# Patient Record
Sex: MALE | Race: BLACK OR AFRICAN AMERICAN | NOT HISPANIC OR LATINO | Employment: OTHER | URBAN - METROPOLITAN AREA
[De-identification: names, ages, dates, MRNs, and addresses within clinical notes are randomized per-mention and may not be internally consistent; named-entity substitution may affect disease eponyms.]

---

## 2017-10-06 DIAGNOSIS — S56.811D: Primary | ICD-10-CM

## 2017-10-12 ENCOUNTER — HOSPITAL ENCOUNTER (OUTPATIENT)
Dept: RADIOLOGY | Facility: HOSPITAL | Age: 67
Discharge: HOME | End: 2017-10-12
Attending: FAMILY MEDICINE
Payer: MEDICARE

## 2017-10-12 DIAGNOSIS — S56.811D: ICD-10-CM

## 2017-10-12 PROCEDURE — 73221 MRI JOINT UPR EXTREM W/O DYE: CPT | Mod: TC,PO,RT

## 2021-03-10 DIAGNOSIS — M51.16 NEURITIS OR RADICULITIS DUE TO RUPTURE OF LUMBAR INTERVERTEBRAL DISC: Primary | ICD-10-CM

## 2021-03-18 ENCOUNTER — HOSPITAL ENCOUNTER (OUTPATIENT)
Dept: RADIOLOGY | Facility: HOSPITAL | Age: 71
Discharge: HOME | End: 2021-03-18
Attending: PHYSICAL MEDICINE & REHABILITATION
Payer: MEDICARE

## 2021-03-18 DIAGNOSIS — M51.16 NEURITIS OR RADICULITIS DUE TO RUPTURE OF LUMBAR INTERVERTEBRAL DISC: ICD-10-CM

## 2021-03-18 PROCEDURE — 72148 MRI LUMBAR SPINE W/O DYE: CPT | Mod: TC,PO

## 2021-03-31 DIAGNOSIS — M54.12 RADICULOPATHY, CERVICAL REGION: Primary | ICD-10-CM

## 2021-03-31 DIAGNOSIS — M51.16 INTERVERTEBRAL DISC DISORDER WITH RADICULOPATHY OF LUMBAR REGION: Primary | ICD-10-CM

## 2021-04-14 ENCOUNTER — HOSPITAL ENCOUNTER (OUTPATIENT)
Dept: RADIOLOGY | Facility: HOSPITAL | Age: 71
Discharge: HOME | End: 2021-04-14
Attending: PHYSICAL MEDICINE & REHABILITATION
Payer: MEDICARE

## 2021-04-14 DIAGNOSIS — M51.16 INTERVERTEBRAL DISC DISORDER WITH RADICULOPATHY OF LUMBAR REGION: ICD-10-CM

## 2021-04-14 DIAGNOSIS — M54.12 RADICULOPATHY, CERVICAL REGION: ICD-10-CM

## 2021-04-14 PROCEDURE — 72141 MRI NECK SPINE W/O DYE: CPT | Mod: TC,PO

## 2021-04-14 PROCEDURE — 72110 X-RAY EXAM L-2 SPINE 4/>VWS: CPT | Mod: TC,FY,PO

## 2021-04-26 ENCOUNTER — HOSPITAL ENCOUNTER (OUTPATIENT)
Dept: RADIOLOGY | Facility: HOSPITAL | Age: 71
Discharge: HOME | End: 2021-04-26
Attending: FAMILY MEDICINE
Payer: MEDICARE

## 2021-04-26 DIAGNOSIS — Z01.818 OTHER SPECIFIED PRE-OPERATIVE EXAMINATION: ICD-10-CM

## 2021-04-26 PROCEDURE — 71046 X-RAY EXAM CHEST 2 VIEWS: CPT | Mod: TC,FY,PO

## 2022-02-09 ENCOUNTER — HOSPITAL ENCOUNTER (OUTPATIENT)
Dept: RADIOLOGY | Facility: HOSPITAL | Age: 72
Discharge: HOME | End: 2022-02-09
Attending: FAMILY MEDICINE
Payer: MEDICARE

## 2022-02-09 DIAGNOSIS — Z01.818 PREOP EXAMINATION: ICD-10-CM

## 2022-02-09 PROCEDURE — 71046 X-RAY EXAM CHEST 2 VIEWS: CPT | Mod: TC,FY,PO

## 2022-11-22 ENCOUNTER — HOSPITAL ENCOUNTER (OUTPATIENT)
Dept: RADIOLOGY | Facility: HOSPITAL | Age: 72
Discharge: HOME | End: 2022-11-22
Attending: FAMILY MEDICINE
Payer: MEDICARE

## 2022-11-22 DIAGNOSIS — K57.92 DIVERTICULITIS: ICD-10-CM

## 2022-11-22 DIAGNOSIS — K57.92 DIVERTICULITIS: Primary | ICD-10-CM

## 2022-11-22 PROCEDURE — 25500020 PHARM REV CODE 255: Mod: PO | Performed by: FAMILY MEDICINE

## 2022-11-22 PROCEDURE — 74178 CT ABD&PLV WO CNTR FLWD CNTR: CPT | Mod: TC,PO

## 2022-11-22 RX ADMIN — IOHEXOL 30 ML: 300 INJECTION, SOLUTION INTRAVENOUS at 01:11

## 2022-11-22 RX ADMIN — IOHEXOL 100 ML: 350 INJECTION, SOLUTION INTRAVENOUS at 01:11

## 2023-04-05 ENCOUNTER — TELEPHONE (OUTPATIENT)
Dept: OPHTHALMOLOGY | Facility: CLINIC | Age: 73
End: 2023-04-05
Payer: MEDICARE

## 2023-04-05 NOTE — TELEPHONE ENCOUNTER
----- Message from Delisa Rhodes sent at 4/5/2023  2:21 PM CDT -----  Regarding: Appt Inquiry   Dr. Daniels office called about pts appt and if pt can be seen with Dr. Beck sooner?    Call back- [phone] Ramya

## 2023-05-16 ENCOUNTER — OFFICE VISIT (OUTPATIENT)
Dept: OPHTHALMOLOGY | Facility: CLINIC | Age: 73
End: 2023-05-16
Payer: MEDICARE

## 2023-05-16 DIAGNOSIS — H40.50X0 SECONDARY OPEN-ANGLE GLAUCOMA: ICD-10-CM

## 2023-05-16 DIAGNOSIS — H43.813 POSTERIOR VITREOUS DETACHMENT, BOTH EYES: ICD-10-CM

## 2023-05-16 DIAGNOSIS — Z96.1 PSEUDOPHAKIA OF BOTH EYES: ICD-10-CM

## 2023-05-16 DIAGNOSIS — H40.52X3 SECONDARY GLAUCOMA, LEFT, SEVERE STAGE: Primary | ICD-10-CM

## 2023-05-16 DIAGNOSIS — H35.3131 EARLY DRY STAGE NONEXUDATIVE AGE-RELATED MACULAR DEGENERATION OF BOTH EYES: ICD-10-CM

## 2023-05-16 DIAGNOSIS — H27.112 LENS SUBLUXATION, LEFT: ICD-10-CM

## 2023-05-16 PROCEDURE — 76514 ECHO EXAM OF EYE THICKNESS: CPT | Mod: PBBFAC,PN | Performed by: STUDENT IN AN ORGANIZED HEALTH CARE EDUCATION/TRAINING PROGRAM

## 2023-05-16 PROCEDURE — 99999 PR PBB SHADOW E&M-EST. PATIENT-LVL II: CPT | Mod: PBBFAC,,, | Performed by: STUDENT IN AN ORGANIZED HEALTH CARE EDUCATION/TRAINING PROGRAM

## 2023-05-16 PROCEDURE — 99204 PR OFFICE/OUTPT VISIT, NEW, LEVL IV, 45-59 MIN: ICD-10-PCS | Mod: S$PBB,,, | Performed by: STUDENT IN AN ORGANIZED HEALTH CARE EDUCATION/TRAINING PROGRAM

## 2023-05-16 PROCEDURE — 92020 GONIOSCOPY: CPT | Mod: PBBFAC,PN | Performed by: STUDENT IN AN ORGANIZED HEALTH CARE EDUCATION/TRAINING PROGRAM

## 2023-05-16 PROCEDURE — 92133 CPTRZD OPH DX IMG PST SGM ON: CPT | Mod: PBBFAC,PN | Performed by: STUDENT IN AN ORGANIZED HEALTH CARE EDUCATION/TRAINING PROGRAM

## 2023-05-16 PROCEDURE — 99999 PR PBB SHADOW E&M-EST. PATIENT-LVL II: ICD-10-PCS | Mod: PBBFAC,,, | Performed by: STUDENT IN AN ORGANIZED HEALTH CARE EDUCATION/TRAINING PROGRAM

## 2023-05-16 PROCEDURE — 99204 OFFICE O/P NEW MOD 45 MIN: CPT | Mod: S$PBB,,, | Performed by: STUDENT IN AN ORGANIZED HEALTH CARE EDUCATION/TRAINING PROGRAM

## 2023-05-16 PROCEDURE — 92083 EXTENDED VISUAL FIELD XM: CPT | Mod: PBBFAC,PN | Performed by: STUDENT IN AN ORGANIZED HEALTH CARE EDUCATION/TRAINING PROGRAM

## 2023-05-16 PROCEDURE — 92020 PR SPECIAL EYE EVAL,GONIOSCOPY: ICD-10-PCS | Mod: S$PBB,,, | Performed by: STUDENT IN AN ORGANIZED HEALTH CARE EDUCATION/TRAINING PROGRAM

## 2023-05-16 PROCEDURE — 92020 GONIOSCOPY: CPT | Mod: S$PBB,,, | Performed by: STUDENT IN AN ORGANIZED HEALTH CARE EDUCATION/TRAINING PROGRAM

## 2023-05-16 PROCEDURE — 76514 PR  US, EYE, FOR CORNEAL THICKNESS: ICD-10-PCS | Mod: 26,S$PBB,, | Performed by: STUDENT IN AN ORGANIZED HEALTH CARE EDUCATION/TRAINING PROGRAM

## 2023-05-16 PROCEDURE — 92133 POSTERIOR SEGMENT OCT OPTIC NERVE(OCULAR COHERENCE TOMOGRAPHY) - OU - BOTH EYES: ICD-10-PCS | Mod: 26,S$PBB,, | Performed by: STUDENT IN AN ORGANIZED HEALTH CARE EDUCATION/TRAINING PROGRAM

## 2023-05-16 PROCEDURE — 92083 HUMPHREY VISUAL FIELD - OU - BOTH EYES: ICD-10-PCS | Mod: 26,S$PBB,, | Performed by: STUDENT IN AN ORGANIZED HEALTH CARE EDUCATION/TRAINING PROGRAM

## 2023-05-16 PROCEDURE — 76514 ECHO EXAM OF EYE THICKNESS: CPT | Mod: 26,S$PBB,, | Performed by: STUDENT IN AN ORGANIZED HEALTH CARE EDUCATION/TRAINING PROGRAM

## 2023-05-16 PROCEDURE — 99212 OFFICE O/P EST SF 10 MIN: CPT | Mod: PBBFAC,PN | Performed by: STUDENT IN AN ORGANIZED HEALTH CARE EDUCATION/TRAINING PROGRAM

## 2023-05-16 RX ORDER — BRIMONIDINE TARTRATE 2 MG/ML
SOLUTION/ DROPS OPHTHALMIC
COMMUNITY
Start: 2023-04-10 | End: 2023-09-05 | Stop reason: SDUPTHER

## 2023-05-16 RX ORDER — DORZOLAMIDE HYDROCHLORIDE AND TIMOLOL MALEATE 20; 5 MG/ML; MG/ML
1 SOLUTION/ DROPS OPHTHALMIC 2 TIMES DAILY
COMMUNITY
Start: 2023-03-08

## 2023-05-16 RX ORDER — ACETAZOLAMIDE 500 MG/1
500 CAPSULE, EXTENDED RELEASE ORAL 2 TIMES DAILY
Qty: 60 CAPSULE | Refills: 2 | Status: SHIPPED | OUTPATIENT
Start: 2023-05-16 | End: 2023-07-07

## 2023-05-16 RX ORDER — NETARSUDIL AND LATANOPROST OPHTHALMIC SOLUTION, 0.02%/0.005% .2; .05 MG/ML; MG/ML
SOLUTION/ DROPS OPHTHALMIC; TOPICAL
COMMUNITY
Start: 2023-03-08 | End: 2023-12-13

## 2023-05-16 NOTE — PROGRESS NOTES
Subjective:       Patient ID: Donald Enamorado is a 72 y.o. male.    Chief Complaint: Glaucoma    HPI    Pt was referred here today by Dr. Plaza in [place] for a Glaucoma   Evaluation OS.  He states he was diagnosed with Glaucoma OS about 10 years ago. Dr. Plaza   also performed his Cataract surgery OU. The vision in the left eye is very   blurry, compared to the right. He did have an SLT OS x 1 and did not have   to use drops for maybe a year or so. He has lots floaters OS. Dr. Plaza   told him the gel in the back of the left eye shifted the lens from his   Cataract surgery. He pressure slowly began to get higher. The pressure OS   was even 50 at one point.    Eye Meds:  Latanoprost QHS OS  Brimonidine BID OS  Cosopt BID OS    Last edited by Mary Beck MD on 5/16/2023 10:52 AM.               Assessment & Plan   Secondary glaucoma, left, severe stage  -     Posterior Segment OCT Optic Nerve- Both eyes  -     Mendoza Visual Field - OU - Extended - Both Eyes  -     Color Fundus Photography - OU - Both Eyes; Future    Secondary open-angle glaucoma  -     acetaZOLAMIDE (DIAMOX) 500 mg CpSR; Take 1 capsule (500 mg total) by mouth 2 (two) times daily.  Dispense: 60 capsule; Refill: 2    Pseudophakia of both eyes    Early dry stage nonexudative age-related macular degeneration of both eyes    Lens subluxation, left    Posterior vitreous detachment, both eyes      // secondary open angle glaucoma severe with tr APD OS   -Fhx (-), Steroids (+),Trauma(-)  -Drops:  Latanoprost QHS OS // Brimonidine BID OS // Cosopt BID OS  -Drop intolerance/contraindication: Rocklatan not effective per prior notes   -Laser: SLT OS x 1 (2-3 years ago)  -Surgeries: CE IOL OU  -CCT: 613 // 619  -Gonio: SS OU  Tm 50s OS per pt    Performed independent review of outside records including notes and tests  Discussed imaging and interpretation with patient.   Discussed eyedrops and if more than one, recommend 5 minutes between all drops.      5/23 RNFL Full // SNS/INS hint SAD/IAD VFI 85% OS  5/23 HVF 24-2 Full // dec G B TS/NS/TI/NI OS    Continue gtts  Start diamox 500 ER BID - discussed side effects    Vitreous in AC may be complicating IOP -- Retina referral for consideration of PPV  Discussed potential for surgical glaucoma intervention if IOP remains high  Would need vitreous in AC removed before glaucoma surgery   Could consider PPV then tube same day     IOL subluxation OS  Retina eval    Pseudophakia OD  Lenses WC OD, monitor    ARMD  Appears mild dry OU  Need OCT-retina  Retina eval     Posterior Vitreous Detachment  Retinal detachment precautions were reviewed the patient.    If any increase in flashing lights or floaters, the patient should return immediately to rule out retinal detachment.        PLAN  Continue all other drops  Start diamox 500 mg PO BID  Retina referral     RTC 6 weeks IOP check or sooner PRN    Mary Beck M.D., M.S.  Department of Ophthalmology   Division of Glaucoma Surgery  [facility]

## 2023-06-29 ENCOUNTER — OFFICE VISIT (OUTPATIENT)
Dept: OPHTHALMOLOGY | Facility: CLINIC | Age: 73
End: 2023-06-29
Payer: MEDICARE

## 2023-06-29 ENCOUNTER — TELEPHONE (OUTPATIENT)
Dept: OPHTHALMOLOGY | Facility: CLINIC | Age: 73
End: 2023-06-29

## 2023-06-29 DIAGNOSIS — H35.3132 INTERMEDIATE STAGE DRY AGE-RELATED MACULAR DEGENERATION OF BOTH EYES: ICD-10-CM

## 2023-06-29 DIAGNOSIS — H40.52X3 SECONDARY GLAUCOMA, LEFT, SEVERE STAGE: ICD-10-CM

## 2023-06-29 DIAGNOSIS — H43.02 VITREOUS IN ANTERIOR CHAMBER OF LEFT EYE: ICD-10-CM

## 2023-06-29 DIAGNOSIS — H43.813 POSTERIOR VITREOUS DETACHMENT, BILATERAL: Primary | ICD-10-CM

## 2023-06-29 DIAGNOSIS — H43.813 POSTERIOR VITREOUS DETACHMENT OF BOTH EYES: Primary | ICD-10-CM

## 2023-06-29 PROCEDURE — 99999 PR PBB SHADOW E&M-EST. PATIENT-LVL II: ICD-10-PCS | Mod: PBBFAC,,, | Performed by: OPHTHALMOLOGY

## 2023-06-29 PROCEDURE — 92134 CPTRZ OPH DX IMG PST SGM RTA: CPT | Mod: PBBFAC | Performed by: OPHTHALMOLOGY

## 2023-06-29 PROCEDURE — 92014 COMPRE OPH EXAM EST PT 1/>: CPT | Mod: S$PBB,,, | Performed by: OPHTHALMOLOGY

## 2023-06-29 PROCEDURE — 99999 PR PBB SHADOW E&M-EST. PATIENT-LVL II: CPT | Mod: PBBFAC,,, | Performed by: OPHTHALMOLOGY

## 2023-06-29 PROCEDURE — 92201 PR OPHTHALMOSCOPY, EXT, W/RET DRAW/SCLERAL DEPR, I&R, UNI/BI: ICD-10-PCS | Mod: S$PBB,,, | Performed by: OPHTHALMOLOGY

## 2023-06-29 PROCEDURE — 92134 POSTERIOR SEGMENT OCT RETINA (OCULAR COHERENCE TOMOGRAPHY)-BOTH EYES: ICD-10-PCS | Mod: 26,S$PBB,, | Performed by: OPHTHALMOLOGY

## 2023-06-29 PROCEDURE — 92201 OPSCPY EXTND RTA DRAW UNI/BI: CPT | Mod: PBBFAC | Performed by: OPHTHALMOLOGY

## 2023-06-29 PROCEDURE — 99212 OFFICE O/P EST SF 10 MIN: CPT | Mod: PBBFAC | Performed by: OPHTHALMOLOGY

## 2023-06-29 PROCEDURE — 92201 OPSCPY EXTND RTA DRAW UNI/BI: CPT | Mod: S$PBB,,, | Performed by: OPHTHALMOLOGY

## 2023-06-29 PROCEDURE — 92014 PR EYE EXAM, EST PATIENT,COMPREHESV: ICD-10-PCS | Mod: S$PBB,,, | Performed by: OPHTHALMOLOGY

## 2023-06-29 NOTE — Clinical Note
May be easier to have me do quick and easy PPV and then send to you shortly thereafter for GDD, as trying to find schedule time together may be a challenge.

## 2023-06-29 NOTE — PROGRESS NOTES
HPI    New Patient- ref by Dr. Beck    Patient here today with c/o blurry VA ou, no pain floaters without flashes   ou. Patient report intolerance to Diamox given by Dr. Beck for IOP OS,   patient discontinued.    Latanoprost QHS OS   Brimonidine BID OS   Cosopt BID OS     Last edited by Brittnee Salgado on 6/29/2023  1:38 PM.        OCT - drusen OU      A/P    Intermediate AMD OU  Discussed AREDS    2. POAG OU  Secondary severe Glc OS  Referred to Dr. Beck from Dr. Plaza  Considering GDD - but would like AC clear prior.    Plan 25g PPV OS for vit opacities and anterior migration of vitreous to anterior chamber  PC Tear - try without AFx to minimize chance of IOL dislocation\  Will discuss timing with Dr. Beck, but may be easiest to have be clean out vit first and then proceed with GDD shortly after    Local MAC  LOC 20 min    Risks, benefits, and alternatives to treatment discussed in detail with the patient.  The patient voiced understanding and wished to proceed with the procedure      3. PCIOL  Open PC OS with vit AC    4. PVD OU       To OR

## 2023-07-07 ENCOUNTER — OFFICE VISIT (OUTPATIENT)
Dept: OPHTHALMOLOGY | Facility: CLINIC | Age: 73
End: 2023-07-07
Payer: MEDICARE

## 2023-07-07 DIAGNOSIS — H43.02 VITREOUS IN ANTERIOR CHAMBER OF LEFT EYE: ICD-10-CM

## 2023-07-07 DIAGNOSIS — H43.813 POSTERIOR VITREOUS DETACHMENT OF BOTH EYES: ICD-10-CM

## 2023-07-07 DIAGNOSIS — H35.3132 INTERMEDIATE STAGE DRY AGE-RELATED MACULAR DEGENERATION OF BOTH EYES: ICD-10-CM

## 2023-07-07 DIAGNOSIS — H43.813 POSTERIOR VITREOUS DETACHMENT, BILATERAL: ICD-10-CM

## 2023-07-07 DIAGNOSIS — H40.52X3 SECONDARY GLAUCOMA, LEFT, SEVERE STAGE: Primary | ICD-10-CM

## 2023-07-07 PROCEDURE — 92012 PR EYE EXAM, EST PATIENT,INTERMED: ICD-10-PCS | Mod: S$PBB,,, | Performed by: STUDENT IN AN ORGANIZED HEALTH CARE EDUCATION/TRAINING PROGRAM

## 2023-07-07 PROCEDURE — 99999 PR PBB SHADOW E&M-EST. PATIENT-LVL III: ICD-10-PCS | Mod: PBBFAC,,, | Performed by: STUDENT IN AN ORGANIZED HEALTH CARE EDUCATION/TRAINING PROGRAM

## 2023-07-07 PROCEDURE — 99999 PR PBB SHADOW E&M-EST. PATIENT-LVL III: CPT | Mod: PBBFAC,,, | Performed by: STUDENT IN AN ORGANIZED HEALTH CARE EDUCATION/TRAINING PROGRAM

## 2023-07-07 PROCEDURE — 92012 INTRM OPH EXAM EST PATIENT: CPT | Mod: S$PBB,,, | Performed by: STUDENT IN AN ORGANIZED HEALTH CARE EDUCATION/TRAINING PROGRAM

## 2023-07-07 PROCEDURE — 99213 OFFICE O/P EST LOW 20 MIN: CPT | Mod: PBBFAC | Performed by: STUDENT IN AN ORGANIZED HEALTH CARE EDUCATION/TRAINING PROGRAM

## 2023-07-07 RX ORDER — OXYCODONE AND ACETAMINOPHEN 5; 325 MG/1; MG/1
1 TABLET ORAL EVERY 6 HOURS PRN
COMMUNITY
Start: 2023-04-26

## 2023-07-07 RX ORDER — BETAMETHASONE DIPROPIONATE 0.5 MG/G
CREAM TOPICAL
COMMUNITY
Start: 2023-05-18

## 2023-07-07 RX ORDER — RIVAROXABAN 10 MG/1
10 TABLET, FILM COATED ORAL
COMMUNITY
Start: 2023-04-12

## 2023-07-07 RX ORDER — PREGABALIN 75 MG/1
75 CAPSULE ORAL 2 TIMES DAILY
COMMUNITY
Start: 2023-04-12

## 2023-07-07 NOTE — PROGRESS NOTES
Subjective:       Patient ID: Donald Enamorado is a 72 y.o. male.    Chief Complaint: Glaucoma     HPI    Patient is here today for IOP check.  Pt. States OS canthus area is sore   and it gets crusty mostly in the morning. Pt reports diamox gave him   diarrhea   Last edited by Mary Beck MD on 7/7/2023  2:28 PM.              Assessment & Plan   Secondary glaucoma, left, severe stage    Vitreous in anterior chamber of left eye    Posterior vitreous detachment, bilateral    Posterior vitreous detachment of both eyes    Intermediate stage dry age-related macular degeneration of both eyes         // secondary open angle glaucoma severe with tr APD OS   -Fhx (-), Steroids (+),Trauma(-)  -Drops:  Latanoprost QHS OS // Brimonidine BID OS // Cosopt BID OS  -Drop intolerance/contraindication: Rocklatan not effective per prior notes, Diamox made patient stomache   -Laser: SLT OS x 1 (2-3 years ago)  -Surgeries: CE IOL OU  -CCT: 613 // 619  -Gonio: SS OU  Tm 50s OS per pt    5/23 RNFL Full // SNS/INS hint SAD/IAD VFI 85% OS  5/23 HVF 24-2 Full // dec G B TS/NS/TI/NI OS    Pt unable to tolerate Diamox  Out of gtt options at this point but IOP actually somewhat better today   Will consider GDD post vitrectomy if needed   Back to me after vit     Medial canthus red OS  Does not look like brimonidine allergy or infection  Many skin conditions  Try vaseline    Vitreous in AC   Vitrectomy with Dr. Harris scheduled 8/2/23       IOL subluxation OS  Retina eval    Pseudophakia OD  Lenses WC OD, monitor    ARMD  Intermediate. AREDS    Posterior Vitreous Detachment  Retinal detachment precautions were reviewed the patient.    If any increase in flashing lights or floaters, the patient should return immediately to rule out retinal detachment.        PLAN  Continue all other drops      RTC post vitrectomy for glc mgmt         Mary Beck M.D., M.S.  Department of Ophthalmology   Division of Glaucoma Surgery  [facility]

## 2023-08-01 ENCOUNTER — TELEPHONE (OUTPATIENT)
Dept: OPHTHALMOLOGY | Facility: CLINIC | Age: 73
End: 2023-08-01
Payer: MEDICARE

## 2023-08-01 NOTE — H&P
Pre-Operative History & Physical  Ophthalmology      SUBJECTIVE:     History of Present Illness:  Patient is a 72 y.o. male presents with vitreous in anterior chamber of the left eye.    MEDICATIONS:   PTA Medications   Medication Sig    albuterol (PROVENTIL) 2.5 mg /3 mL (0.083 %) nebulizer solution Take 2.5 mg by nebulization every 6 (six) hours as needed for Wheezing.    betamethasone dipropionate 0.05 % cream APPLY TOPICALLY TO THE AFFECTED AREA TWICE DAILY AS NEEDED    brimonidine 0.2% (ALPHAGAN) 0.2 % Drop Place into both eyes.    COSOPT 22.3-6.8 mg/mL ophthalmic solution Place 1 drop into the left eye 2 (two) times daily.    cetirizine (ZYRTEC) 10 MG tablet Take 10 mg by mouth daily as needed for Allergies.    oxyCODONE-acetaminophen (PERCOCET) 5-325 mg per tablet Take 1 tablet by mouth every 6 (six) hours as needed.    pregabalin (LYRICA) 75 MG capsule Take 75 mg by mouth 2 (two) times daily.    ROCKLATAN 0.02-0.005 % Drop     XARELTO 10 mg Tab Take 10 mg by mouth.       ALLERGIES:   Review of patient's allergies indicates:   Allergen Reactions    Aspirin Other (See Comments)     Upset Stomach    Ciprofloxacin Diarrhea       PAST MEDICAL HISTORY:   Past Medical History:   Diagnosis Date    Arthritis     Asthma     Osteoarthritis      PAST SURGICAL HISTORY:   Past Surgical History:   Procedure Laterality Date    ANKLE FUSION Right     KNEE ARTHROSCOPY      left leg surgery      TONSILLECTOMY       PAST FAMILY HISTORY: History reviewed. No pertinent family history.  SOCIAL HISTORY:   Social History     Tobacco Use    Smoking status: Never   Substance Use Topics    Alcohol use: No        MENTAL STATUS: Alert    REVIEW OF SYSTEMS: Negative    OBJECTIVE:     Vital Signs (Most Recent)  Temp: 98.1 °F (36.7 °C) (08/02/23 1020)  Pulse: (!) 59 (08/02/23 1020)  Resp: 18 (08/02/23 1020)  BP: (!) 140/80 (08/02/23 1020)  SpO2: 100 % (08/02/23 1020)    Physical Exam:  General: NAD  HEENT: AT/NC, vitreous opacities in  AC  Lungs: Adequate respirations  Heart: + pulses  Abdomen: Soft    ASSESSMENT/PLAN:     Patient is a 72 y.o. male with vitreous prolapse in AC s/p cat surgery with open posterior capsule     - Risks/benefits/alternatives of the procedure including, but not limited to, infection, bleeding, pain, ptosis, macular edema, corneal edema, persistent corneal defect, retinal tears, retinal detachment, epiretinal membrane, elevated intraocular pressure, hypotony, possible need for strict post-op head positioning, possible temporary avoidance of air travel, loss of vision, loss of the eye, paralysis, and death were discussed with the patient and/or family. The patient/family voiced good understanding, the informed consent was signed, witnessed, and placed in chart. All patient and family questions were answered.   - Will proceed with 25G PPV OS  - Plan for MAC with retrobulbar block anesthesia   - Allergies reviewed:   Review of patient's allergies indicates:   Allergen Reactions    Aspirin Other (See Comments)     Upset Stomach    Ciprofloxacin Diarrhea           Graham Fink MD  Vitreoretinal Surgery Fellow  Department of Ophthalmology

## 2023-08-02 ENCOUNTER — ANESTHESIA EVENT (OUTPATIENT)
Dept: SURGERY | Facility: HOSPITAL | Age: 73
End: 2023-08-02
Payer: MEDICARE

## 2023-08-02 ENCOUNTER — HOSPITAL ENCOUNTER (OUTPATIENT)
Facility: HOSPITAL | Age: 73
Discharge: HOME | End: 2023-08-02
Attending: OPHTHALMOLOGY | Admitting: OPHTHALMOLOGY
Payer: MEDICARE

## 2023-08-02 ENCOUNTER — ANESTHESIA (OUTPATIENT)
Dept: SURGERY | Facility: HOSPITAL | Age: 73
End: 2023-08-02
Payer: MEDICARE

## 2023-08-02 VITALS
HEART RATE: 48 BPM | OXYGEN SATURATION: 100 % | DIASTOLIC BLOOD PRESSURE: 70 MMHG | TEMPERATURE: 98 F | BODY MASS INDEX: 33.99 KG/M2 | RESPIRATION RATE: 18 BRPM | WEIGHT: 204 LBS | SYSTOLIC BLOOD PRESSURE: 145 MMHG | HEIGHT: 65 IN

## 2023-08-02 DIAGNOSIS — H43.02 VITREOUS IN ANTERIOR CHAMBER OF LEFT EYE: Primary | ICD-10-CM

## 2023-08-02 DIAGNOSIS — H40.52X3 SECONDARY GLAUCOMA, LEFT, SEVERE STAGE: ICD-10-CM

## 2023-08-02 DIAGNOSIS — H43.813 POSTERIOR VITREOUS DETACHMENT, BILATERAL: ICD-10-CM

## 2023-08-02 PROCEDURE — D9220A PRA ANESTHESIA: ICD-10-PCS | Mod: ANES,,, | Performed by: STUDENT IN AN ORGANIZED HEALTH CARE EDUCATION/TRAINING PROGRAM

## 2023-08-02 PROCEDURE — 71000015 HC POSTOP RECOV 1ST HR: Performed by: OPHTHALMOLOGY

## 2023-08-02 PROCEDURE — D9220A PRA ANESTHESIA: Mod: CRNA,,, | Performed by: NURSE ANESTHETIST, CERTIFIED REGISTERED

## 2023-08-02 PROCEDURE — 67036 REMOVAL OF INNER EYE FLUID: CPT | Mod: LT,,, | Performed by: OPHTHALMOLOGY

## 2023-08-02 PROCEDURE — C1784 OCULAR DEV, INTRAOP, DET RET: HCPCS | Performed by: OPHTHALMOLOGY

## 2023-08-02 PROCEDURE — 25000003 PHARM REV CODE 250: Performed by: OPHTHALMOLOGY

## 2023-08-02 PROCEDURE — 37000009 HC ANESTHESIA EA ADD 15 MINS: Performed by: OPHTHALMOLOGY

## 2023-08-02 PROCEDURE — 71000044 HC DOSC ROUTINE RECOVERY FIRST HOUR: Performed by: OPHTHALMOLOGY

## 2023-08-02 PROCEDURE — D9220A PRA ANESTHESIA: Mod: ANES,,, | Performed by: STUDENT IN AN ORGANIZED HEALTH CARE EDUCATION/TRAINING PROGRAM

## 2023-08-02 PROCEDURE — 63600175 PHARM REV CODE 636 W HCPCS: Performed by: NURSE ANESTHETIST, CERTIFIED REGISTERED

## 2023-08-02 PROCEDURE — 67036 PR VITRECTOMY,MECHANICAL: ICD-10-PCS | Mod: LT,,, | Performed by: OPHTHALMOLOGY

## 2023-08-02 PROCEDURE — 36000706: Performed by: OPHTHALMOLOGY

## 2023-08-02 PROCEDURE — 63600175 PHARM REV CODE 636 W HCPCS: Performed by: OPHTHALMOLOGY

## 2023-08-02 PROCEDURE — 36000707: Performed by: OPHTHALMOLOGY

## 2023-08-02 PROCEDURE — 25000003 PHARM REV CODE 250: Performed by: STUDENT IN AN ORGANIZED HEALTH CARE EDUCATION/TRAINING PROGRAM

## 2023-08-02 PROCEDURE — 27201423 OPTIME MED/SURG SUP & DEVICES STERILE SUPPLY: Performed by: OPHTHALMOLOGY

## 2023-08-02 PROCEDURE — D9220A PRA ANESTHESIA: ICD-10-PCS | Mod: CRNA,,, | Performed by: NURSE ANESTHETIST, CERTIFIED REGISTERED

## 2023-08-02 PROCEDURE — 25000003 PHARM REV CODE 250: Performed by: NURSE ANESTHETIST, CERTIFIED REGISTERED

## 2023-08-02 PROCEDURE — 37000008 HC ANESTHESIA 1ST 15 MINUTES: Performed by: OPHTHALMOLOGY

## 2023-08-02 RX ORDER — ATROPINE SULFATE 10 MG/ML
1 SOLUTION/ DROPS OPHTHALMIC
Status: DISCONTINUED | OUTPATIENT
Start: 2023-08-02 | End: 2023-08-02 | Stop reason: HOSPADM

## 2023-08-02 RX ORDER — OXYCODONE AND ACETAMINOPHEN 5; 325 MG/1; MG/1
1 TABLET ORAL EVERY 6 HOURS PRN
Qty: 12 TABLET | Refills: 0 | Status: SHIPPED | OUTPATIENT
Start: 2023-08-02

## 2023-08-02 RX ORDER — ONDANSETRON 4 MG/1
4 TABLET, FILM COATED ORAL EVERY 8 HOURS PRN
Qty: 12 TABLET | Refills: 0 | Status: SHIPPED | OUTPATIENT
Start: 2023-08-02

## 2023-08-02 RX ORDER — MIDAZOLAM HYDROCHLORIDE 1 MG/ML
INJECTION, SOLUTION INTRAMUSCULAR; INTRAVENOUS
Status: DISCONTINUED | OUTPATIENT
Start: 2023-08-02 | End: 2023-08-02

## 2023-08-02 RX ORDER — LIDOCAINE HYDROCHLORIDE 20 MG/ML
INJECTION, SOLUTION EPIDURAL; INFILTRATION; INTRACAUDAL; PERINEURAL
Status: DISCONTINUED
Start: 2023-08-02 | End: 2023-08-02 | Stop reason: HOSPADM

## 2023-08-02 RX ORDER — PREDNISOLONE ACETATE 10 MG/ML
1 SUSPENSION/ DROPS OPHTHALMIC
Status: DISCONTINUED | OUTPATIENT
Start: 2023-08-02 | End: 2023-08-02 | Stop reason: HOSPADM

## 2023-08-02 RX ORDER — TETRACAINE HYDROCHLORIDE 5 MG/ML
1 SOLUTION OPHTHALMIC
Status: DISCONTINUED | OUTPATIENT
Start: 2023-08-02 | End: 2023-08-02 | Stop reason: HOSPADM

## 2023-08-02 RX ORDER — PROPOFOL 10 MG/ML
VIAL (ML) INTRAVENOUS
Status: DISCONTINUED | OUTPATIENT
Start: 2023-08-02 | End: 2023-08-02

## 2023-08-02 RX ORDER — BUPIVACAINE HYDROCHLORIDE 7.5 MG/ML
INJECTION, SOLUTION EPIDURAL; RETROBULBAR
Status: DISCONTINUED | OUTPATIENT
Start: 2023-08-02 | End: 2023-08-02 | Stop reason: HOSPADM

## 2023-08-02 RX ORDER — ACETAMINOPHEN 325 MG/1
650 TABLET ORAL EVERY 4 HOURS PRN
Status: DISCONTINUED | OUTPATIENT
Start: 2023-08-02 | End: 2023-08-02 | Stop reason: HOSPADM

## 2023-08-02 RX ORDER — MOXIFLOXACIN 5 MG/ML
1 SOLUTION/ DROPS OPHTHALMIC
Status: DISCONTINUED | OUTPATIENT
Start: 2023-08-02 | End: 2023-08-02 | Stop reason: HOSPADM

## 2023-08-02 RX ORDER — PHENYLEPHRINE HYDROCHLORIDE 25 MG/ML
1 SOLUTION/ DROPS OPHTHALMIC
Status: DISCONTINUED | OUTPATIENT
Start: 2023-08-02 | End: 2023-08-02 | Stop reason: HOSPADM

## 2023-08-02 RX ORDER — NEOMYCIN SULFATE, POLYMYXIN B SULFATE, AND DEXAMETHASONE 3.5; 10000; 1 MG/G; [USP'U]/G; MG/G
OINTMENT OPHTHALMIC
Status: DISCONTINUED
Start: 2023-08-02 | End: 2023-08-02 | Stop reason: HOSPADM

## 2023-08-02 RX ORDER — EPINEPHRINE 1 MG/ML
INJECTION, SOLUTION, CONCENTRATE INTRAVENOUS
Status: DISCONTINUED
Start: 2023-08-02 | End: 2023-08-02 | Stop reason: HOSPADM

## 2023-08-02 RX ORDER — SODIUM CHLORIDE 9 MG/ML
INJECTION, SOLUTION INTRAVENOUS CONTINUOUS
Status: DISCONTINUED | OUTPATIENT
Start: 2023-08-02 | End: 2023-08-02 | Stop reason: HOSPADM

## 2023-08-02 RX ORDER — LIDOCAINE HYDROCHLORIDE 20 MG/ML
INJECTION INTRAVENOUS
Status: DISCONTINUED | OUTPATIENT
Start: 2023-08-02 | End: 2023-08-02

## 2023-08-02 RX ORDER — DEXAMETHASONE SODIUM PHOSPHATE 4 MG/ML
INJECTION, SOLUTION INTRA-ARTICULAR; INTRALESIONAL; INTRAMUSCULAR; INTRAVENOUS; SOFT TISSUE
Status: DISCONTINUED
Start: 2023-08-02 | End: 2023-08-02 | Stop reason: HOSPADM

## 2023-08-02 RX ORDER — HYDROCODONE BITARTRATE AND ACETAMINOPHEN 5; 325 MG/1; MG/1
1 TABLET ORAL EVERY 4 HOURS PRN
Status: DISCONTINUED | OUTPATIENT
Start: 2023-08-02 | End: 2023-08-02 | Stop reason: HOSPADM

## 2023-08-02 RX ORDER — DEXAMETHASONE SODIUM PHOSPHATE 4 MG/ML
INJECTION, SOLUTION INTRA-ARTICULAR; INTRALESIONAL; INTRAMUSCULAR; INTRAVENOUS; SOFT TISSUE
Status: DISCONTINUED | OUTPATIENT
Start: 2023-08-02 | End: 2023-08-02 | Stop reason: HOSPADM

## 2023-08-02 RX ORDER — NEOMYCIN SULFATE, POLYMYXIN B SULFATE, AND DEXAMETHASONE 3.5; 10000; 1 MG/G; [USP'U]/G; MG/G
OINTMENT OPHTHALMIC
Status: DISCONTINUED | OUTPATIENT
Start: 2023-08-02 | End: 2023-08-02 | Stop reason: HOSPADM

## 2023-08-02 RX ORDER — FENTANYL CITRATE 50 UG/ML
INJECTION, SOLUTION INTRAMUSCULAR; INTRAVENOUS
Status: DISCONTINUED | OUTPATIENT
Start: 2023-08-02 | End: 2023-08-02

## 2023-08-02 RX ORDER — LIDOCAINE HYDROCHLORIDE 10 MG/ML
1 INJECTION, SOLUTION EPIDURAL; INFILTRATION; INTRACAUDAL; PERINEURAL ONCE
Status: COMPLETED | OUTPATIENT
Start: 2023-08-02 | End: 2023-08-02

## 2023-08-02 RX ORDER — EPINEPHRINE 1 MG/ML
INJECTION, SOLUTION, CONCENTRATE INTRAVENOUS
Status: DISCONTINUED | OUTPATIENT
Start: 2023-08-02 | End: 2023-08-02 | Stop reason: HOSPADM

## 2023-08-02 RX ADMIN — PROPOFOL 30 MG: 10 INJECTION, EMULSION INTRAVENOUS at 12:08

## 2023-08-02 RX ADMIN — SODIUM CHLORIDE: 9 INJECTION, SOLUTION INTRAVENOUS at 10:08

## 2023-08-02 RX ADMIN — ATROPINE SULFATE 1 DROP: 10 SOLUTION/ DROPS OPHTHALMIC at 10:08

## 2023-08-02 RX ADMIN — TETRACAINE HYDROCHLORIDE 1 DROP: 5 SOLUTION OPHTHALMIC at 10:08

## 2023-08-02 RX ADMIN — PROPOFOL 50 MG: 10 INJECTION, EMULSION INTRAVENOUS at 12:08

## 2023-08-02 RX ADMIN — PREDNISOLONE ACETATE 1 DROP: 10 SUSPENSION/ DROPS OPHTHALMIC at 10:08

## 2023-08-02 RX ADMIN — MIDAZOLAM HYDROCHLORIDE 1 MG: 1 INJECTION, SOLUTION INTRAMUSCULAR; INTRAVENOUS at 11:08

## 2023-08-02 RX ADMIN — PHENYLEPHRINE HYDROCHLORIDE 1 DROP: 25 SOLUTION/ DROPS OPHTHALMIC at 10:08

## 2023-08-02 RX ADMIN — MOXIFLOXACIN OPHTHALMIC 1 DROP: 5 SOLUTION/ DROPS OPHTHALMIC at 10:08

## 2023-08-02 RX ADMIN — FENTANYL CITRATE 25 MCG: 50 INJECTION, SOLUTION INTRAMUSCULAR; INTRAVENOUS at 12:08

## 2023-08-02 RX ADMIN — LIDOCAINE HYDROCHLORIDE 10 MG: 10 INJECTION, SOLUTION EPIDURAL; INFILTRATION; INTRACAUDAL; PERINEURAL at 10:08

## 2023-08-02 RX ADMIN — LIDOCAINE HYDROCHLORIDE 20 MG: 20 INJECTION INTRAVENOUS at 12:08

## 2023-08-02 NOTE — BRIEF OP NOTE
PREOPERATIVE DIAGNOSIS: Vitreous prolapse OS     POSTOPERATIVE DIAGNOSIS:  same     PROCEDURE PERFORMED:  A 25-gauge pars plana vitrectomy anterior chamber washoud to the left eye.     ATTENDING SURGEON:  JANET Harris M.D.     ANESTHESIA:  Monitored anesthesia care with a retrobulbar injection of 4.0 mL   mixture of 0.75% Marcaine and 2% Xylocaine.     ESTIMATED BLOOD LOSS:  Minimal.     COMPLICATIONS:  None.     DISPOSITION:  Stable to Recovery.    DOS/DC - 8/2/23     INDICATIONS FOR SURGERY:  This is a 72-year-old who noted is considering glaucoma surgery with Dr. Beck - who noted 1 piece acrylic lens, with a rupture posterior capsule and vitreous in the anterior chamber. Decision was made to remove the vitreous to allow for a future glaucoma procedure.    Risks, benefits, and alternatives of surgery were discussed in detail with   risks including loss of vision, loss of eye, retinal detachment, infection,   hemorrhage, cataract formation, lens dislocation, glaucoma, hypotony, ptosis,   and diplopia.  The patient voiced understanding and wished to proceed with the   procedure.     DESCRIPTION OF PROCEDURE:  After proper informed consent was obtained, the   patient was brought back to the Operating Suite at [facility] where   MAC anesthesia was induced.  Retrobulbar injection was provided as above without   complication.  The patient was prepped and draped in normal sterile fashion for   ophthalmic surgery and lid speculum was placed in the left eye.  A standard   three-port 25-gauge pars plana vitrectomy set up with the infusion cannula was   inserted 3.5 mm posterior to the limbus.  The infusion cannula was turned on only   after observed to be free and clear of all underlying retinal tissue.  The superonasal and superotemporal trochars were also placed 3.5 mm posterior to the limbus.The   vitrector and light pipe were introduced in the vitreous cavity and a core   vitrectomy was performed.   The posterior hyaloid was already elevated, but it   was  out to the level of the vitreous base.  Scleral   depression was performed 360 degrees to help with removal of the cortical   vitreous.  No identifiable retinal breaks were found.  the temporal haptic was almost loose.  The lens was rotated so the haptic was engaged in the lens capsule/Sommering ring. A paracentesis was created.  The vitrector was introduced into the anterior chamber to remove the vitreous.  The wound was stromally hydrated.  The trocars were removed from the eye, not leaking after gentle   massage, and the eye was normal pressure via palpation.  Subconjunctival   injections of vancomycin and Decadron were given to the patient.  The drapes   were removed from the patient.  Pt was washed free of Betadine prep solution.    Maxitrol ointment was placed in the right eye.  The eye was patch shielded.  MAC   anesthesia was reversed and she was brought to Recovery Room in stable   condition, tolerating the procedure well.  Dr. Harris was present for the   entire case.

## 2023-08-02 NOTE — DISCHARGE SUMMARY
[facility] - Surgery (1st Fl)  Discharge Note  Short Stay    Procedure(s) (LRB):  VITRECTOMY, PARS PLANA APPROACH (Left)      OUTCOME: Patient tolerated treatment/procedure well without complication and is now ready for discharge.    DISPOSITION: Home or Self Care    FINAL DIAGNOSIS:  Vitreous in anterior chamber of left eye    FOLLOWUP: In clinic    DISCHARGE INSTRUCTIONS:    Discharge Procedure Orders   Diet general     Lifting restrictions     Call MD for:  temperature >100.4     Call MD for:  persistent nausea and vomiting     Call MD for:  severe uncontrolled pain     Call MD for:  difficulty breathing, headache or visual disturbances     Call MD for:  redness, tenderness, or signs of infection (pain, swelling, redness, odor or green/yellow discharge around incision site)     Call MD for:  hives     Call MD for:  persistent dizziness or light-headedness     Call MD for:  extreme fatigue        TIME SPENT ON DISCHARGE:    minutes

## 2023-08-02 NOTE — TRANSFER OF CARE
"Anesthesia Transfer of Care Note    Patient: Donald Enamorado    Procedure(s) Performed: Procedure(s) (LRB):  VITRECTOMY, PARS PLANA APPROACH (Left)    Patient location: Other: [place]    Anesthesia Type: general and MAC    Transport from OR: Transported from OR on room air with adequate spontaneous ventilation    Post pain: adequate analgesia    Post assessment: no apparent anesthetic complications and tolerated procedure well    Post vital signs: stable    Level of consciousness: awake, alert and oriented    Nausea/Vomiting: no nausea/vomiting    Complications: none    Transfer of care protocol was followed      Last vitals:   Visit Vitals  BP (!) 140/80 (BP Location: Left arm, Patient Position: Lying)   Pulse (!) 59   Temp 36.7 °C (98.1 °F)   Resp 18   Ht 5' 5" (1.651 m)   Wt 92.5 kg (204 lb)   SpO2 100%   BMI 33.95 kg/m²     "

## 2023-08-02 NOTE — PLAN OF CARE
Patient ready for discharge. No complaints voiced.  No distress noted; tolerating PO fluids.  Instructions given to patient and spouse.  Both verbalized understanding of post-op instructions.

## 2023-08-02 NOTE — BRIEF OP NOTE
PREOPERATIVE DIAGNOSIS: Vitreous prolapse OS     POSTOPERATIVE DIAGNOSIS:  same     PROCEDURE PERFORMED:  A 25-gauge pars plana vitrectomy anterior chamber washoud to the left eye.    Attending Surgeon: Kimberly    Assistant Surgeon: Zack    Anesthesia: Local/Mac, retrobulbar injection of 4.0cc mixture 0.75%Marcaine, 2% Xylocaine    Estimated blood loss: Minimal    Complication: None    Specimen: None    Disposition: Stable to recovery    Findings/Outcome: PC tear, one piece was nasally haptic needed to be secured. Vit removed from AC    Date of Discharge: 8/2/23    Discharge Disposition: stable to recovery then home    F/U: tomorrow

## 2023-08-02 NOTE — ANESTHESIA PREPROCEDURE EVALUATION
08/02/2023  Donald Enamorado is a 72 y.o., male.    Pre-operative evaluation for Procedure(s) (LRB):  VITRECTOMY, PARS PLANA APPROACH (Left)    Patient Active Problem List   Diagnosis    Osteoarthritis of left knee    Posterior vitreous detachment, bilateral    Vitreous in anterior chamber of left eye    Secondary glaucoma, left, severe stage    Intermediate stage dry age-related macular degeneration of both eyes            Peripheral IV - Single Lumen 08/02/23 1012 20 G Anterior;Right Hand (Active)   Number of days: 0       Medications Prior to Admission   Medication Sig Dispense Refill Last Dose    albuterol (PROVENTIL) 2.5 mg /3 mL (0.083 %) nebulizer solution Take 2.5 mg by nebulization every 6 (six) hours as needed for Wheezing.   Past Month    betamethasone dipropionate 0.05 % cream APPLY TOPICALLY TO THE AFFECTED AREA TWICE DAILY AS NEEDED   8/1/2023    brimonidine 0.2% (ALPHAGAN) 0.2 % Drop Place into both eyes.   8/2/2023    COSOPT 22.3-6.8 mg/mL ophthalmic solution Place 1 drop into the left eye 2 (two) times daily.   8/2/2023    cetirizine (ZYRTEC) 10 MG tablet Take 10 mg by mouth daily as needed for Allergies.   More than a month    oxyCODONE-acetaminophen (PERCOCET) 5-325 mg per tablet Take 1 tablet by mouth every 6 (six) hours as needed.   More than a month    pregabalin (LYRICA) 75 MG capsule Take 75 mg by mouth 2 (two) times daily.   More than a month    ROCKLATAN 0.02-0.005 % Drop        XARELTO 10 mg Tab Take 10 mg by mouth.   More than a month       Review of patient's allergies indicates:   Allergen Reactions    Aspirin Other (See Comments)     Upset Stomach    Ciprofloxacin Diarrhea       Past Medical History:   Diagnosis Date    Arthritis     Asthma     Osteoarthritis      Past Surgical History:   Procedure Laterality Date    ANKLE FUSION Right     KNEE ARTHROSCOPY  "     left leg surgery      TONSILLECTOMY       Tobacco Use    Smoking status: Never    Smokeless tobacco: Not on file   Substance and Sexual Activity    Alcohol use: No    Drug use: Not on file    Sexual activity: Not on file       Objective:     Vital Signs (Most Recent):    Vital Signs (24h Range):           There is no height or weight on file to calculate BMI.        Significant Labs:  All pertinent labs from the last 24 hours have been reviewed.    CBC: No results for input(s): "WBC", "RBC", "HGB", "HCT", "PLT", "MCV", "MCH", "MCHC" in the last 72 hours.    CMP: No results for input(s): "NA", "K", "CL", "CO2", "BUN", "CREATININE", "GLU", "MG", "PHOS", "CALCIUM", "ALBUMIN", "PROT", "ALKPHOS", "ALT", "AST", "BILITOT" in the last 72 hours.    INR  No results for input(s): "PT", "INR", "PROTIME", "APTT" in the last 72 hours.      Pre-op Assessment    I have reviewed the Patient Summary Reports.     I have reviewed the Nursing Notes. I have reviewed the NPO Status.   I have reviewed the Medications.     Review of Systems  Anesthesia Hx:  Denies Family Hx of Anesthesia complications.   Denies Personal Hx of Anesthesia complications.       Physical Exam  General: Well nourished    Airway:  Mallampati: II   Mouth Opening: Normal  TM Distance: Normal  Tongue: Normal  Neck ROM: Normal ROM    Dental:Any loose and/or missing teeth verified with patient   Chest/Lungs:  Clear to auscultation    Heart:  Rate: Normal  Rhythm: Regular Rhythm  Sounds: Normal    Abdomen:  Normal        Anesthesia Plan  Type of Anesthesia, risks & benefits discussed:    Anesthesia Type: Gen ETT, Gen Supraglottic Airway, Gen Natural Airway, MAC  Intra-op Monitoring Plan: Standard ASA Monitors  Post Op Pain Control Plan: multimodal analgesia and IV/PO Opioids PRN  Induction:  IV  Informed Consent: Informed consent signed with the Patient and all parties understand the risks and agree with anesthesia plan.  All questions answered.   ASA Score: " 2    Ready For Surgery From Anesthesia Perspective.     .

## 2023-08-03 ENCOUNTER — OFFICE VISIT (OUTPATIENT)
Dept: OPHTHALMOLOGY | Facility: CLINIC | Age: 73
End: 2023-08-03
Payer: MEDICARE

## 2023-08-03 DIAGNOSIS — H43.813 POSTERIOR VITREOUS DETACHMENT, BILATERAL: ICD-10-CM

## 2023-08-03 DIAGNOSIS — H35.3132 INTERMEDIATE STAGE DRY AGE-RELATED MACULAR DEGENERATION OF BOTH EYES: Primary | ICD-10-CM

## 2023-08-03 DIAGNOSIS — H43.02 VITREOUS IN ANTERIOR CHAMBER OF LEFT EYE: ICD-10-CM

## 2023-08-03 PROCEDURE — 99024 POSTOP FOLLOW-UP VISIT: CPT | Mod: POP,,, | Performed by: OPHTHALMOLOGY

## 2023-08-03 PROCEDURE — 99024 PR POST-OP FOLLOW-UP VISIT: ICD-10-PCS | Mod: POP,,, | Performed by: OPHTHALMOLOGY

## 2023-08-03 PROCEDURE — 99999 PR PBB SHADOW E&M-EST. PATIENT-LVL III: ICD-10-PCS | Mod: PBBFAC,,, | Performed by: OPHTHALMOLOGY

## 2023-08-03 PROCEDURE — 99213 OFFICE O/P EST LOW 20 MIN: CPT | Mod: PBBFAC | Performed by: OPHTHALMOLOGY

## 2023-08-03 PROCEDURE — 99999 PR PBB SHADOW E&M-EST. PATIENT-LVL III: CPT | Mod: PBBFAC,,, | Performed by: OPHTHALMOLOGY

## 2023-08-03 NOTE — Clinical Note
IOL in position, but definite dislocation risk.   Will have him return to you early next week glc for surgical consideration

## 2023-08-03 NOTE — PROGRESS NOTES
HPI    Pt is here today for 1 Day Post Op OS. No pain/discomfort. Floaters Ou,   longstanding prior to sx.   Last edited by Sylvia Veronica on 8/3/2023  9:25 AM.             OCT - drusen OU      A/P    Intermediate AMD OU  Discussed AREDS    2. POAG OU  Secondary severe Glc OS  Referred to Dr. Beck from Dr. Plaza  Considering GDD - but would like AC clear prior.    S/p 25g PPV OS for vit opacities and anterior migration of vitreous to anterior chamber 8/2/23    Doing well, IOP stable  IOL was unstable during case, but in place.  Pt aware of dislocation risk factors    Start gtts QID  Ointment/shield QHS    Ok for Glc procedure with Dr. Beck    Can return to me for any IOL dislocation issues or other retina problems PRN      3. PCIOL  Open PC OS with vit AC    4. PVD OU       Return to Dr. Beck

## 2023-08-03 NOTE — ANESTHESIA POSTPROCEDURE EVALUATION
Anesthesia Post Evaluation    Patient: Donald Enamorado    Procedure(s) Performed: Procedure(s) (LRB):  VITRECTOMY, PARS PLANA APPROACH (Left)    Final Anesthesia Type: general      Patient location during evaluation: PACU  Patient participation: Yes- Able to Participate  Level of consciousness: awake and alert  Post-procedure vital signs: reviewed and stable  Pain management: adequate  Airway patency: patent    PONV status at discharge: No PONV  Anesthetic complications: no      Cardiovascular status: stable  Respiratory status: spontaneous ventilation and face mask  Hydration status: euvolemic  Follow-up not needed.          Vitals Value Taken Time   /70 08/02/23 1345   Temp 36.4 °C (97.5 °F) 08/02/23 1245   Pulse 48 08/02/23 1345   Resp 18 08/02/23 1245   SpO2 100 % 08/02/23 1345         No case tracking events are documented in the log.      Pain/Ellen Score: Ellen Score: 10 (8/2/2023  1:50 PM)

## 2023-08-10 ENCOUNTER — OFFICE VISIT (OUTPATIENT)
Dept: OPHTHALMOLOGY | Facility: CLINIC | Age: 73
End: 2023-08-10
Payer: MEDICARE

## 2023-08-10 DIAGNOSIS — H35.3132 INTERMEDIATE STAGE DRY AGE-RELATED MACULAR DEGENERATION OF BOTH EYES: Primary | ICD-10-CM

## 2023-08-10 DIAGNOSIS — H43.813 POSTERIOR VITREOUS DETACHMENT, BILATERAL: ICD-10-CM

## 2023-08-10 DIAGNOSIS — H40.52X3 SECONDARY GLAUCOMA, LEFT, SEVERE STAGE: ICD-10-CM

## 2023-08-10 PROCEDURE — 99024 POSTOP FOLLOW-UP VISIT: CPT | Mod: POP,,, | Performed by: OPHTHALMOLOGY

## 2023-08-10 PROCEDURE — 99999 PR PBB SHADOW E&M-EST. PATIENT-LVL III: CPT | Mod: PBBFAC,,, | Performed by: OPHTHALMOLOGY

## 2023-08-10 PROCEDURE — 99024 PR POST-OP FOLLOW-UP VISIT: ICD-10-PCS | Mod: POP,,, | Performed by: OPHTHALMOLOGY

## 2023-08-10 PROCEDURE — 99999 PR PBB SHADOW E&M-EST. PATIENT-LVL III: ICD-10-PCS | Mod: PBBFAC,,, | Performed by: OPHTHALMOLOGY

## 2023-08-10 PROCEDURE — 99213 OFFICE O/P EST LOW 20 MIN: CPT | Mod: PBBFAC | Performed by: OPHTHALMOLOGY

## 2023-08-10 NOTE — PROGRESS NOTES
HPI     post op  1 week      Additional comments: Post op  ARMD   GLAUCOMA  OS            Comments    DLS 08/03/23    EYE MEDS    Vigamox qid os   Isopto homatropine  qid os  [place]- poly -dex qhs os            Last edited by Payal Raya on 8/10/2023  1:29 PM.        HPI    Pt is here today for 1 Day Post Op OS. No pain/discomfort. Floaters Ou,   longstanding prior to sx.   Last edited by Sylvia Veronica on 8/3/2023  9:25 AM.             OCT - drusen OU      A/P    Intermediate AMD OU  Discussed AREDS    2. POAG OU  Secondary severe Glc OS  Referred to Dr. Beck from Dr. Plaza  Considering GDD - but would like AC clear prior.    S/p 25g PPV OS for vit opacities and anterior migration of vitreous to anterior chamber 8/2/23    Doing well, IOP stable  IOL was unstable during case, but in place.  Pt aware of dislocation risk factors    Taper PF 2/1/0    Ok for Glc procedure with Dr. Beck    Can return to me for any IOL dislocation issues or other retina problems PRN      3. PCIOL  Open PC OS with vit AC    4. PVD OU       Return to Dr. Beck    Me PRN

## 2023-09-05 ENCOUNTER — OFFICE VISIT (OUTPATIENT)
Dept: OPHTHALMOLOGY | Facility: CLINIC | Age: 73
End: 2023-09-05
Payer: MEDICARE

## 2023-09-05 DIAGNOSIS — H21.562 AFFERENT PUPILLARY DEFECT OF LEFT EYE: ICD-10-CM

## 2023-09-05 DIAGNOSIS — Z96.1 PSEUDOPHAKIA OF BOTH EYES: ICD-10-CM

## 2023-09-05 DIAGNOSIS — H43.813 POSTERIOR VITREOUS DETACHMENT, BILATERAL: ICD-10-CM

## 2023-09-05 DIAGNOSIS — H35.3132 INTERMEDIATE STAGE DRY AGE-RELATED MACULAR DEGENERATION OF BOTH EYES: ICD-10-CM

## 2023-09-05 DIAGNOSIS — H40.52X3 SECONDARY GLAUCOMA, LEFT, SEVERE STAGE: Primary | ICD-10-CM

## 2023-09-05 PROBLEM — H43.02: Status: RESOLVED | Noted: 2023-06-29 | Resolved: 2023-09-05

## 2023-09-05 PROCEDURE — 99213 OFFICE O/P EST LOW 20 MIN: CPT | Mod: PBBFAC,PN | Performed by: STUDENT IN AN ORGANIZED HEALTH CARE EDUCATION/TRAINING PROGRAM

## 2023-09-05 PROCEDURE — 99214 PR OFFICE/OUTPT VISIT, EST, LEVL IV, 30-39 MIN: ICD-10-PCS | Mod: 24,S$PBB,, | Performed by: STUDENT IN AN ORGANIZED HEALTH CARE EDUCATION/TRAINING PROGRAM

## 2023-09-05 PROCEDURE — 99214 OFFICE O/P EST MOD 30 MIN: CPT | Mod: 24,S$PBB,, | Performed by: STUDENT IN AN ORGANIZED HEALTH CARE EDUCATION/TRAINING PROGRAM

## 2023-09-05 PROCEDURE — 99999 PR PBB SHADOW E&M-EST. PATIENT-LVL III: ICD-10-PCS | Mod: PBBFAC,,, | Performed by: STUDENT IN AN ORGANIZED HEALTH CARE EDUCATION/TRAINING PROGRAM

## 2023-09-05 PROCEDURE — 99999 PR PBB SHADOW E&M-EST. PATIENT-LVL III: CPT | Mod: PBBFAC,,, | Performed by: STUDENT IN AN ORGANIZED HEALTH CARE EDUCATION/TRAINING PROGRAM

## 2023-09-05 RX ORDER — BRIMONIDINE TARTRATE 2 MG/ML
1 SOLUTION/ DROPS OPHTHALMIC 3 TIMES DAILY
Qty: 15 ML | Refills: 3 | Status: SHIPPED | OUTPATIENT
Start: 2023-09-05 | End: 2024-09-04

## 2023-09-05 NOTE — PROGRESS NOTES
Subjective:       Patient ID: Donald Enamorado is a 72 y.o. male.    Chief Complaint: Glaucoma     HPI    Pt reports he had cataract surgery, then had IOP elevation, then had SLT   in the left eye. He reports then COVID hit and he didn't see the eye   doctor for some time. When he had a follow up appt, he was found to have   IOP of 50 mmHg and it was completely painless. He does not know how long   the IOP was elevated to this level.    Eye Meds:  Latanoprost qHS OS  Brimonidine BID OS  Timolol BID OS    Last edited by Mary Beck MD on 9/5/2023  4:02 PM.              Assessment & Plan   Secondary glaucoma, left, severe stage  -     brimonidine 0.2% (ALPHAGAN) 0.2 % Drop; Place 1 drop into both eyes 3 (three) times daily.  Dispense: 15 mL; Refill: 3    Posterior vitreous detachment, bilateral  -     brimonidine 0.2% (ALPHAGAN) 0.2 % Drop; Place 1 drop into both eyes 3 (three) times daily.  Dispense: 15 mL; Refill: 3    Intermediate stage dry age-related macular degeneration of both eyes  -     brimonidine 0.2% (ALPHAGAN) 0.2 % Drop; Place 1 drop into both eyes 3 (three) times daily.  Dispense: 15 mL; Refill: 3    Pseudophakia of both eyes  -     brimonidine 0.2% (ALPHAGAN) 0.2 % Drop; Place 1 drop into both eyes 3 (three) times daily.  Dispense: 15 mL; Refill: 3    Afferent pupillary defect of left eye  -     brimonidine 0.2% (ALPHAGAN) 0.2 % Drop; Place 1 drop into both eyes 3 (three) times daily.  Dispense: 15 mL; Refill: 3       // secondary open angle glaucoma severe with APD OS upon presentation  -Fhx (-), Steroids (+),Trauma(-)  -Drops:  Latanoprost QHS OS // Brimonidine BID OS // Cosopt BID OS  -Drop intolerance/contraindication: Rocklatan not effective per prior notes, Diamox made patient stomache   -Laser: SLT OS x 1 (2-3 years ago)  -Surgeries: CE IOL OU  -CCT: 613 // 619  -Gonio: SS OU  Tm 50s OS per pt    5/23 RNFL Full // SNS/INS hint SAD/IAD VFI 85% OS  5/23 HVF 24-2 Full // dec G B TS/NS/TI/NI  OS    Still with vit in the AC, but s/p PPV. IOP slightly lower. Will check HVF/OCT in 2 months. If worse, will need incisional surgery with anterior vitrectomy. If stable, will continue to medically manage. Patient prefers to stay out of OR if needed.       Vitreous in AC OS  A/p PPV Dr. Harris 8/2/23       IOL subluxation OS  Return to Dr. Harris if dislocates or needs sutured     Pseudophakia OD  Lenses WC OD, monitor    ARMD  Intermediate. AREDS  Not using - educate and provide informatino     Posterior Vitreous Detachment  Retinal detachment precautions were reviewed the patient.    If any increase in flashing lights or floaters, the patient should return immediately to rule out retinal detachment.        PLAN  Continue all other drops  AREDS2 BID       RTC 2 months HVF and OCT-RNFL      Mary Beck M.D., M.S.  Department of Ophthalmology   Division of Glaucoma Surgery  [facility]

## 2023-12-05 ENCOUNTER — OFFICE VISIT (OUTPATIENT)
Dept: OPHTHALMOLOGY | Facility: CLINIC | Age: 73
End: 2023-12-05
Payer: MEDICARE

## 2023-12-05 DIAGNOSIS — H40.52X3 SECONDARY GLAUCOMA, LEFT, SEVERE STAGE: Primary | ICD-10-CM

## 2023-12-05 DIAGNOSIS — H43.02 VITREOUS IN ANTERIOR CHAMBER OF LEFT EYE: ICD-10-CM

## 2023-12-05 DIAGNOSIS — Z96.1 PSEUDOPHAKIA OF BOTH EYES: ICD-10-CM

## 2023-12-05 DIAGNOSIS — H35.3132 INTERMEDIATE STAGE DRY AGE-RELATED MACULAR DEGENERATION OF BOTH EYES: ICD-10-CM

## 2023-12-05 DIAGNOSIS — H21.562 AFFERENT PUPILLARY DEFECT OF LEFT EYE: ICD-10-CM

## 2023-12-05 PROCEDURE — 99999 PR PBB SHADOW E&M-EST. PATIENT-LVL II: ICD-10-PCS | Mod: PBBFAC,,, | Performed by: STUDENT IN AN ORGANIZED HEALTH CARE EDUCATION/TRAINING PROGRAM

## 2023-12-05 PROCEDURE — 92133 CPTRZD OPH DX IMG PST SGM ON: CPT | Mod: PBBFAC,PN | Performed by: STUDENT IN AN ORGANIZED HEALTH CARE EDUCATION/TRAINING PROGRAM

## 2023-12-05 PROCEDURE — 92020 GONIOSCOPY: CPT | Mod: S$PBB,,, | Performed by: STUDENT IN AN ORGANIZED HEALTH CARE EDUCATION/TRAINING PROGRAM

## 2023-12-05 PROCEDURE — 99212 OFFICE O/P EST SF 10 MIN: CPT | Mod: PBBFAC,PN | Performed by: STUDENT IN AN ORGANIZED HEALTH CARE EDUCATION/TRAINING PROGRAM

## 2023-12-05 PROCEDURE — 92020 GONIOSCOPY: CPT | Mod: PBBFAC,PN | Performed by: STUDENT IN AN ORGANIZED HEALTH CARE EDUCATION/TRAINING PROGRAM

## 2023-12-05 PROCEDURE — 99214 OFFICE O/P EST MOD 30 MIN: CPT | Mod: S$PBB,,, | Performed by: STUDENT IN AN ORGANIZED HEALTH CARE EDUCATION/TRAINING PROGRAM

## 2023-12-05 PROCEDURE — 99214 PR OFFICE/OUTPT VISIT, EST, LEVL IV, 30-39 MIN: ICD-10-PCS | Mod: S$PBB,,, | Performed by: STUDENT IN AN ORGANIZED HEALTH CARE EDUCATION/TRAINING PROGRAM

## 2023-12-05 PROCEDURE — 92133 POSTERIOR SEGMENT OCT OPTIC NERVE(OCULAR COHERENCE TOMOGRAPHY) - OU - BOTH EYES: ICD-10-PCS | Mod: 26,S$PBB,, | Performed by: STUDENT IN AN ORGANIZED HEALTH CARE EDUCATION/TRAINING PROGRAM

## 2023-12-05 PROCEDURE — 99999 PR PBB SHADOW E&M-EST. PATIENT-LVL II: CPT | Mod: PBBFAC,,, | Performed by: STUDENT IN AN ORGANIZED HEALTH CARE EDUCATION/TRAINING PROGRAM

## 2023-12-05 PROCEDURE — 92020 PR SPECIAL EYE EVAL,GONIOSCOPY: ICD-10-PCS | Mod: S$PBB,,, | Performed by: STUDENT IN AN ORGANIZED HEALTH CARE EDUCATION/TRAINING PROGRAM

## 2023-12-05 NOTE — PROGRESS NOTES
Subjective:       Patient ID: Donald Enamorado is a 73 y.o. male.    Chief Complaint: Glaucoma    HPI    Pt states he feels like his vision is stable. However he reports that he   notices a blind spot in the left temporal portion of his vision that had   been enlarging up until 3-4 weeks ago. He reports compliance with his eye   drops.   Last edited by Mary Beck MD on 12/5/2023  4:20 PM.               Assessment & Plan   Secondary glaucoma, left, severe stage  -     Posterior Segment OCT Optic Nerve- Both eyes  -     Mendoza Visual Field - OU - Extended - Both Eyes; Future    Vitreous in anterior chamber of left eye    Intermediate stage dry age-related macular degeneration of both eyes    Pseudophakia of both eyes    Afferent pupillary defect of left eye      // secondary open angle glaucoma severe with APD OS upon presentation  -Fhx (-), Steroids (+),Trauma(-)  -Drops:  Latanoprost QHS OS // Brimonidine BID OS // Cosopt BID OS  -Drop intolerance/contraindication: Rocklatan not effective per prior notes, Diamox made patient stomache   -Laser: SLT OS x 1 (2-3 years ago)  -Surgeries: CE IOL OU  -CCT: 613 // 619  -Gonio: SS OU  Tm 50s OS per pt    5/23 HVF 24-2  Full // SNS/INS hint SAD/IAD VFI 85% OS    5/23 RNFL Full // dec G B TS/NS/TI/NI OS  12/12 RNFL Full OD // dec TS B NS/G/TI OS    Still with vit in the AC, but s/p PPV. IOP slightly lower. Will check HVF/OCT in 2 months. If worse, will need incisional surgery with anterior vitrectomy. If stable, will continue to medically manage. Patient prefers to stay out of OR if needed.     HVF obtained today but inaccurate - likely equipment malfunction -- obtain at main campus      Vitreous in AC OS  A/p PPV Dr. Harris 8/2/23       IOL subluxation OS  Return to Dr. Harris if dislocates or needs sutured     Pseudophakia OD  Lenses WC OD, monitor    ARMD  Intermediate. AREDS  Not using - educate and provide informatino     Posterior Vitreous  Detachment  Retinal detachment precautions were reviewed the patient.    If any increase in flashing lights or floaters, the patient should return immediately to rule out retinal detachment.            PLAN  IOP is stable and RNFL is stable  Continue present management with drops for now  However unable to evaluate HVF today due to equipment malfunction - will reschedule at [facility] for surgical evaluation     RTC ASAP for HVF 24-2 - [facility]      Mary Beck M.D., M.S.  Department of Ophthalmology   Division of Glaucoma Surgery  [facility]

## 2023-12-12 NOTE — PROGRESS NOTES
Subjective:       Patient ID: Donald Enamorado is a 73 y.o. male.    Chief Complaint: Glaucoma     HPI    DLS: 12/5/23    Latanoprost QHS OS  Brimonidine BID OS  Cosopt BID OS    Pt here for HVF review.  Pt without complaints today OU.   Last edited by Elle Scott MA on 12/13/2023  3:52 PM.              Assessment & Plan   Secondary glaucoma, left, severe stage    Vitreous in anterior chamber of left eye    Intermediate stage dry age-related macular degeneration of both eyes    Pseudophakia of both eyes    Afferent pupillary defect of left eye    // secondary open angle glaucoma severe with APD OS upon presentation  -Fhx (-), Steroids (+),Trauma(-)  -Drops:  Latanoprost QHS OS // Brimonidine BID OS // Cosopt BID OS  -Drop intolerance/contraindication: Rocklatan not effective per prior notes, Diamox made patient stomache   -Laser: SLT OS x 1 (2-3 years ago)  -Surgeries: CE IOL OU  -CCT: 613 // 619  -Gonio: SS OU  Tm 50s OS per pt    5/23 HVF 24-2  Full // SNS/INS hint SAD/IAD VFI 85% OS    5/23 RNFL Full // dec G B TS/NS/TI/NI OS  12/12 RNFL Full OD // dec TS B NS/G/TI OS    Still with vit in the AC, but s/p PPV. IOP slightly lower. Will check HVF/OCT in 2 months. If worse, will need incisional surgery with anterior vitrectomy. If stable, will continue to medically manage. Patient prefers to stay out of OR if needed.     12/2023 HVF 24-2 Few nonspecific scattered defects OD VFI 97% // SAD/SNS/INS/IAD VFI 80% OS    IOP great! HVF is worse however. Discussed with patient. Agree to continue to monitor on drops. Had IOP max of 50.       Vitreous in AC OS  A/p PPV Dr. Harris 8/2/23       IOL subluxation OS  Return to Dr. Harris if dislocates or needs sutured     Pseudophakia OD  Lenses WC OD, monitor    ARMD  Intermediate. AREDS  Not using - educate and provide informatino     Posterior Vitreous Detachment  Retinal detachment precautions were reviewed the patient.    If any increase in flashing lights or  floaters, the patient should return immediately to rule out retinal detachment.            PLAN  IOP is stable   Continue present management with drops for now        RTC 3 months IOP check dest    Will need Hvf 24-2 -- 6 months at [facility]         Mary Beck M.D., M.S.  Department of Ophthalmology   Division of Glaucoma Surgery  [facility]

## 2023-12-13 ENCOUNTER — OFFICE VISIT (OUTPATIENT)
Dept: OPHTHALMOLOGY | Facility: CLINIC | Age: 73
End: 2023-12-13
Payer: MEDICARE

## 2023-12-13 ENCOUNTER — CLINICAL SUPPORT (OUTPATIENT)
Dept: OPHTHALMOLOGY | Facility: CLINIC | Age: 73
End: 2023-12-13
Payer: MEDICARE

## 2023-12-13 DIAGNOSIS — H40.52X3 SECONDARY GLAUCOMA, LEFT, SEVERE STAGE: ICD-10-CM

## 2023-12-13 DIAGNOSIS — H21.562 AFFERENT PUPILLARY DEFECT OF LEFT EYE: ICD-10-CM

## 2023-12-13 DIAGNOSIS — H40.52X3 SECONDARY GLAUCOMA, LEFT, SEVERE STAGE: Primary | ICD-10-CM

## 2023-12-13 DIAGNOSIS — Z96.1 PSEUDOPHAKIA OF BOTH EYES: ICD-10-CM

## 2023-12-13 DIAGNOSIS — H35.3132 INTERMEDIATE STAGE DRY AGE-RELATED MACULAR DEGENERATION OF BOTH EYES: ICD-10-CM

## 2023-12-13 DIAGNOSIS — H43.02 VITREOUS IN ANTERIOR CHAMBER OF LEFT EYE: ICD-10-CM

## 2023-12-13 PROCEDURE — 99999 PR PBB SHADOW E&M-EST. PATIENT-LVL III: ICD-10-PCS | Mod: PBBFAC,,, | Performed by: STUDENT IN AN ORGANIZED HEALTH CARE EDUCATION/TRAINING PROGRAM

## 2023-12-13 PROCEDURE — 99999 PR PBB SHADOW E&M-EST. PATIENT-LVL III: CPT | Mod: PBBFAC,,, | Performed by: STUDENT IN AN ORGANIZED HEALTH CARE EDUCATION/TRAINING PROGRAM

## 2023-12-13 PROCEDURE — 99214 OFFICE O/P EST MOD 30 MIN: CPT | Mod: S$PBB,,, | Performed by: STUDENT IN AN ORGANIZED HEALTH CARE EDUCATION/TRAINING PROGRAM

## 2023-12-13 PROCEDURE — 99214 PR OFFICE/OUTPT VISIT, EST, LEVL IV, 30-39 MIN: ICD-10-PCS | Mod: S$PBB,,, | Performed by: STUDENT IN AN ORGANIZED HEALTH CARE EDUCATION/TRAINING PROGRAM

## 2023-12-13 PROCEDURE — 99213 OFFICE O/P EST LOW 20 MIN: CPT | Mod: PBBFAC | Performed by: STUDENT IN AN ORGANIZED HEALTH CARE EDUCATION/TRAINING PROGRAM

## 2023-12-13 RX ORDER — LATANOPROST 50 UG/ML
1 SOLUTION/ DROPS OPHTHALMIC NIGHTLY
COMMUNITY
End: 2024-03-05 | Stop reason: SDUPTHER

## 2023-12-13 NOTE — PROGRESS NOTES
Visual field test done.  Patient stated no latex allergies used coverlet      MRX 1.50+ 1.00 x 179            2.50 + 1.25 x 16

## 2024-03-04 NOTE — PROGRESS NOTES
Subjective:       Patient ID: Donald Enamorado is a 73 y.o. male.    Chief Complaint: Glaucoma     HPI    DLS: 12/13/2023 Dr. Beck    Pt presents today for IOP check.   Pt reports occasional blurry vision and irritation OS.    Eye Meds:  Latanoprost qHS OS - needs refill  Brimonidine BID OS   Cosopt BID OS  Last edited by Santa Coffman MA on 3/5/2024  1:14 PM.              Assessment & Plan   Secondary glaucoma, left, severe stage  -     latanoprost 0.005 % ophthalmic solution; Place 1 drop into the left eye every evening.  Dispense: 2.5 mL; Refill: 6    Vitreous in anterior chamber of left eye    Intermediate stage dry age-related macular degeneration of both eyes    Afferent pupillary defect of left eye    // secondary open angle glaucoma severe with APD OS upon presentation  -Fhx (-), Steroids (+),Trauma(-)  -Drops:  Latanoprost QHS OS // Brimonidine BID OS // Cosopt BID OS  -Drop intolerance/contraindication: Rocklatan not effective per prior notes, Diamox made patient stomache   -Laser: SLT OS x 1 (2-3 years ago)  -Surgeries: CE IOL OU  -CCT: 613 // 619  -Gonio: SS OU  Tm 50s OS per pt    5/23 HVF 24-2  Full // SNS/INS hint SAD/IAD VFI 85% OS    5/23 RNFL Full // dec G B TS/NS/TI/NI OS  12/12 RNFL Full OD // dec TS B NS/G/TI OS    Still with vit in the AC, but s/p PPV. IOP slightly lower. Will check HVF/OCT in 2 months. If worse, will need incisional surgery with anterior vitrectomy. If stable, will continue to medically manage. Patient prefers to stay out of OR if needed.     12/2023 HVF 24-2 Few nonspecific scattered defects OD VFI 97% // SAD/SNS/INS/IAD VFI 80% OS    IOP great! HVF is worse however. Discussed with patient. Agree to continue to monitor on drops. Had IOP max of 50.     Additional treatment required to stabilize glaucoma.  Discussed options, risks, and benefits of additional medications, laser treatment including SLT laser or G6 laser, or incisional glaucoma surgery.      Recommend  Glaucoma drainage device left eye, and anterior vitrectomy, left eye     Patient chooses same     Reviewed importance of continued compliance with treatment and follow up.      D/c asa 2 weeks before sx          Vitreous in AC OS  A/p PPV Dr. Harris 8/2/23       IOL subluxation OS  Return to Dr. Harris if dislocates or needs sutured     Pseudophakia OD  Lenses WC OD, monitor    ARMD  Intermediate. AREDS  Not using - educate and provide informatino     Posterior Vitreous Detachment  Retinal detachment precautions were reviewed the patient.    If any increase in flashing lights or floaters, the patient should return immediately to rule out retinal detachment.            PLAN  IOP is stable but pt wants relief from the drops  To OR -- Glaucoma drainage device left eye, and anterior vitrectomy, left eye    Continue present management with drops for now until OR        RTC POD#1 AVG + patch graft + anterior vitrectomy, left eye         Mary Beck M.D., M.S.  Department of Ophthalmology   Division of Glaucoma Surgery  [facility]

## 2024-03-05 ENCOUNTER — OFFICE VISIT (OUTPATIENT)
Dept: OPHTHALMOLOGY | Facility: CLINIC | Age: 74
End: 2024-03-05
Payer: MEDICARE

## 2024-03-05 DIAGNOSIS — H43.02 VITREOUS IN ANTERIOR CHAMBER OF LEFT EYE: ICD-10-CM

## 2024-03-05 DIAGNOSIS — H35.3132 INTERMEDIATE STAGE DRY AGE-RELATED MACULAR DEGENERATION OF BOTH EYES: ICD-10-CM

## 2024-03-05 DIAGNOSIS — H40.52X3 SECONDARY GLAUCOMA, LEFT, SEVERE STAGE: Primary | ICD-10-CM

## 2024-03-05 DIAGNOSIS — H21.562 AFFERENT PUPILLARY DEFECT OF LEFT EYE: ICD-10-CM

## 2024-03-05 PROCEDURE — 99999 PR PBB SHADOW E&M-EST. PATIENT-LVL III: CPT | Mod: PBBFAC,,, | Performed by: STUDENT IN AN ORGANIZED HEALTH CARE EDUCATION/TRAINING PROGRAM

## 2024-03-05 PROCEDURE — 99214 OFFICE O/P EST MOD 30 MIN: CPT | Mod: S$PBB,,, | Performed by: STUDENT IN AN ORGANIZED HEALTH CARE EDUCATION/TRAINING PROGRAM

## 2024-03-05 PROCEDURE — 99213 OFFICE O/P EST LOW 20 MIN: CPT | Mod: PBBFAC,PN | Performed by: STUDENT IN AN ORGANIZED HEALTH CARE EDUCATION/TRAINING PROGRAM

## 2024-03-05 RX ORDER — MECLIZINE HYDROCHLORIDE 25 MG/1
25 TABLET ORAL EVERY 8 HOURS PRN
COMMUNITY
Start: 2023-11-20

## 2024-03-05 RX ORDER — LATANOPROST 50 UG/ML
1 SOLUTION/ DROPS OPHTHALMIC NIGHTLY
Qty: 2.5 ML | Refills: 6 | Status: ON HOLD | OUTPATIENT
Start: 2024-03-05 | End: 2024-04-04 | Stop reason: HOSPADM

## 2024-03-05 NOTE — Clinical Note
Please schedule ahmed OS With anterior vitrectomy He said to call at home --> leave a message if he doesn't answer

## 2024-03-07 ENCOUNTER — TELEPHONE (OUTPATIENT)
Dept: OPHTHALMOLOGY | Facility: CLINIC | Age: 74
End: 2024-03-07
Payer: MEDICARE

## 2024-03-07 DIAGNOSIS — H40.52X3 SECONDARY GLAUCOMA, LEFT, SEVERE STAGE: Primary | ICD-10-CM

## 2024-03-15 ENCOUNTER — TELEPHONE (OUTPATIENT)
Dept: OPHTHALMOLOGY | Facility: CLINIC | Age: 74
End: 2024-03-15
Payer: MEDICARE

## 2024-03-15 NOTE — TELEPHONE ENCOUNTER
Called patient lvm to schedule sx    ----- Message from Mary Beck MD sent at 3/5/2024  1:34 PM CST -----  Please schedule ahmed OS  With anterior vitrectomy  He said to call at home --> leave a message if he doesn't answer

## 2024-04-02 NOTE — H&P
Ophthalmology Preoperative History and Physical Exam     CC/Reason for Surgery: Primary Open Angle Glaucoma, severe stage, on maximally tolerated medical treatment with need for further IOP lowering, left eye and vitreous in anterior chamber    HPI:   Pt presents c/o progressive gradual loss of vision due to glaucoma. Pt reports consistency with maximally tolerated medical treatment. Pt understands the need for further IOP lowering and presents for planned anterior vitrectomy followed by insertion of glaucoma drainage device.     Past Medical History:  Past Medical History:   Diagnosis Date    Arthritis     Asthma     Osteoarthritis         Past Surgical History:  Past Surgical History:   Procedure Laterality Date    ANKLE FUSION Right     KNEE ARTHROSCOPY      left leg surgery      TONSILLECTOMY      VITRECTOMY BY PARS PLANA APPROACH Left 8/2/2023    Procedure: VITRECTOMY, PARS PLANA APPROACH;  Surgeon: JANET Harris MD;  Location: [facility] OR [address] [place] [address];  Service: Ophthalmology;  Laterality: Left;  20 min        Past Ocular History:  See prior clinic note    Allergies:  Review of patient's allergies indicates:   Allergen Reactions    Aspirin Other (See Comments)     Upset Stomach    Ciprofloxacin Diarrhea        Social History:  Social History     Socioeconomic History    Marital status:    Tobacco Use    Smoking status: Never   Substance and Sexual Activity    Alcohol use: No        Medications:  No current facility-administered medications for this encounter.    Current Outpatient Medications:     albuterol (PROVENTIL) 2.5 mg /3 mL (0.083 %) nebulizer solution, Take 2.5 mg by nebulization every 6 (six) hours as needed for Wheezing., Disp: , Rfl:     betamethasone dipropionate 0.05 % cream, APPLY TOPICALLY TO THE AFFECTED AREA TWICE DAILY AS NEEDED, Disp: , Rfl:     brimonidine 0.2% (ALPHAGAN) 0.2 % Drop, Place 1 drop into both eyes 3 (three) times daily., Disp: 15 mL, Rfl: 3    cetirizine (ZYRTEC) 10 MG  "tablet, Take 10 mg by mouth daily as needed for Allergies., Disp: , Rfl:     COSOPT 22.3-6.8 mg/mL ophthalmic solution, Place 1 drop into the left eye 2 (two) times daily., Disp: , Rfl:     latanoprost 0.005 % ophthalmic solution, Place 1 drop into the left eye every evening., Disp: 2.5 mL, Rfl: 6    meclizine (ANTIVERT) 25 mg tablet, Take 25 mg by mouth every 8 (eight) hours as needed., Disp: , Rfl:     ondansetron (ZOFRAN) 4 MG tablet, Take 1 tablet (4 mg total) by mouth every 8 (eight) hours as needed for Nausea., Disp: 12 tablet, Rfl: 0    oxyCODONE-acetaminophen (PERCOCET) 5-325 mg per tablet, Take 1 tablet by mouth every 6 (six) hours as needed., Disp: , Rfl:     oxyCODONE-acetaminophen (PERCOCET) 5-325 mg per tablet, Take 1 tablet by mouth every 6 (six) hours as needed for Pain., Disp: 12 tablet, Rfl: 0    pregabalin (LYRICA) 75 MG capsule, Take 75 mg by mouth 2 (two) times daily., Disp: , Rfl:     XARELTO 10 mg Tab, Take 10 mg by mouth., Disp: , Rfl:      Family History:  No family history on file.     ROS:   Constitutional: WNL   Eyes: See HPI   Ears: WNL   CV: WNL   Resp: WNL   Gastro: WNL    Musculo: WNL   Skin: WNL   Neuro: WNL     Physical Exam:  See nursing intake for vitals    General: No acute distress  HEENT: NC/AT  CV: Pulses strong and equal bilaterally  Resp: Breathing comfortably on room air  Musculoskeletal: WNL, able to lay flat    Lab Results:  CBC w/Diff   Lab Results   Component Value Date/Time    WBC 7.85 11/22/2022 12:26 PM    RBC 4.52 (L) 11/22/2022 12:26 PM    HGB 14.7 11/22/2022 12:26 PM    HCT 43.6 11/22/2022 12:26 PM    MCV 97 11/22/2022 12:26 PM    MCH 32.5 (H) 11/22/2022 12:26 PM    MCHC 33.7 11/22/2022 12:26 PM    RDW 12.1 11/22/2022 12:26 PM    MPV 10.2 11/22/2022 12:26 PM    No components found for: "NEUT", "ANC", "LYMA", "ALC", "MARGARITA", "AMC", "EOSA", "AEC", "BASA", "ABC"     Imaging:  None    Assessment:  1: Primary Open Angle Glaucoma, severe stage, on maximally tolerated " medical treatment with need for further IOP lowering, left eye  And vitreous prolapse in anterior chamber    Plan:  To operating room for anterior vitrectomy and Insertion of Glaucoma Drainage Device with Ahmed Valve versus Baerveldt  Left Eye    An extensive discussion took place with the patient concerning the risks, benefits and alternatives to the above procedure. The patient was given the opportunity to have all questions answered. At the conclusion of our discussion, signed informed consent was obtained.     Mary Beck M.D., M.S.  Department of Ophthalmology   Division of Glaucoma Surgery  [facility]

## 2024-04-02 NOTE — PRE-PROCEDURE INSTRUCTIONS
PreOp Instructions given:   - Verbal medication information (what to hold and what to take)   - NPO guidelines 2300  - Arrival place directions given; time to be given the day before procedure by the   Surgeon's Office [facility] - Detailed instructions given - pt v/u  - Bathing with antibacterial soap   - Don't wear any jewelry or bring any valuables AM of surgery   - No makeup or moisturizer to face   - No perfume/cologne, powder, lotions or aftershave   Pt. verbalized understanding.   Pt denies any h/o Anesthesia/Sedation complications or side effects.  Patient does not know arrival time.  Explained that this information comes from the surgeon's office and if they haven't heard from them by 2 or 3 pm to call the office.  Patient stated an understanding.     Msg sent to Celestino Higuera regarding procedure laterality.

## 2024-04-03 ENCOUNTER — TELEPHONE (OUTPATIENT)
Dept: OPHTHALMOLOGY | Facility: CLINIC | Age: 74
End: 2024-04-03
Payer: MEDICARE

## 2024-04-04 ENCOUNTER — ANESTHESIA EVENT (OUTPATIENT)
Dept: SURGERY | Facility: HOSPITAL | Age: 74
End: 2024-04-04
Payer: MEDICARE

## 2024-04-04 ENCOUNTER — ANESTHESIA (OUTPATIENT)
Dept: SURGERY | Facility: HOSPITAL | Age: 74
End: 2024-04-04
Payer: MEDICARE

## 2024-04-04 ENCOUNTER — HOSPITAL ENCOUNTER (OUTPATIENT)
Facility: HOSPITAL | Age: 74
Discharge: HOME | End: 2024-04-04
Attending: STUDENT IN AN ORGANIZED HEALTH CARE EDUCATION/TRAINING PROGRAM | Admitting: STUDENT IN AN ORGANIZED HEALTH CARE EDUCATION/TRAINING PROGRAM
Payer: MEDICARE

## 2024-04-04 VITALS
DIASTOLIC BLOOD PRESSURE: 64 MMHG | HEART RATE: 61 BPM | HEIGHT: 65 IN | RESPIRATION RATE: 10 BRPM | WEIGHT: 205 LBS | TEMPERATURE: 98 F | OXYGEN SATURATION: 100 % | BODY MASS INDEX: 34.16 KG/M2 | SYSTOLIC BLOOD PRESSURE: 141 MMHG

## 2024-04-04 DIAGNOSIS — H40.52X3 SECONDARY GLAUCOMA, LEFT, SEVERE STAGE: Primary | ICD-10-CM

## 2024-04-04 DIAGNOSIS — H40.1123 PRIMARY OPEN ANGLE GLAUCOMA (POAG) OF LEFT EYE, SEVERE STAGE: ICD-10-CM

## 2024-04-04 PROCEDURE — 25000003 PHARM REV CODE 250: Performed by: STUDENT IN AN ORGANIZED HEALTH CARE EDUCATION/TRAINING PROGRAM

## 2024-04-04 PROCEDURE — 63600175 PHARM REV CODE 636 W HCPCS: Performed by: NURSE ANESTHETIST, CERTIFIED REGISTERED

## 2024-04-04 PROCEDURE — D9220A PRA ANESTHESIA: Mod: ANES,,, | Performed by: ANESTHESIOLOGY

## 2024-04-04 PROCEDURE — 71000016 HC POSTOP RECOV ADDL HR: Performed by: STUDENT IN AN ORGANIZED HEALTH CARE EDUCATION/TRAINING PROGRAM

## 2024-04-04 PROCEDURE — 25000003 PHARM REV CODE 250: Performed by: ANESTHESIOLOGY

## 2024-04-04 PROCEDURE — 63600175 PHARM REV CODE 636 W HCPCS: Performed by: STUDENT IN AN ORGANIZED HEALTH CARE EDUCATION/TRAINING PROGRAM

## 2024-04-04 PROCEDURE — 25000003 PHARM REV CODE 250

## 2024-04-04 PROCEDURE — 67010 PARTIAL REMOVAL OF EYE FLUID: CPT | Mod: 51,LT,, | Performed by: STUDENT IN AN ORGANIZED HEALTH CARE EDUCATION/TRAINING PROGRAM

## 2024-04-04 PROCEDURE — 36000706: Performed by: STUDENT IN AN ORGANIZED HEALTH CARE EDUCATION/TRAINING PROGRAM

## 2024-04-04 PROCEDURE — 36000707: Performed by: STUDENT IN AN ORGANIZED HEALTH CARE EDUCATION/TRAINING PROGRAM

## 2024-04-04 PROCEDURE — D9220A PRA ANESTHESIA: Mod: CRNA,,, | Performed by: STUDENT IN AN ORGANIZED HEALTH CARE EDUCATION/TRAINING PROGRAM

## 2024-04-04 PROCEDURE — V2785 CORNEAL TISSUE PROCESSING: HCPCS | Performed by: STUDENT IN AN ORGANIZED HEALTH CARE EDUCATION/TRAINING PROGRAM

## 2024-04-04 PROCEDURE — 37000009 HC ANESTHESIA EA ADD 15 MINS: Performed by: STUDENT IN AN ORGANIZED HEALTH CARE EDUCATION/TRAINING PROGRAM

## 2024-04-04 PROCEDURE — 27800903 OPTIME MED/SURG SUP & DEVICES OTHER IMPLANTS: Performed by: STUDENT IN AN ORGANIZED HEALTH CARE EDUCATION/TRAINING PROGRAM

## 2024-04-04 PROCEDURE — 37000008 HC ANESTHESIA 1ST 15 MINUTES: Performed by: STUDENT IN AN ORGANIZED HEALTH CARE EDUCATION/TRAINING PROGRAM

## 2024-04-04 PROCEDURE — 66180 AQUEOUS SHUNT EYE W/GRAFT: CPT | Mod: LT,,, | Performed by: STUDENT IN AN ORGANIZED HEALTH CARE EDUCATION/TRAINING PROGRAM

## 2024-04-04 PROCEDURE — 71000044 HC DOSC ROUTINE RECOVERY FIRST HOUR: Performed by: STUDENT IN AN ORGANIZED HEALTH CARE EDUCATION/TRAINING PROGRAM

## 2024-04-04 PROCEDURE — C1783 OCULAR IMP, AQUEOUS DRAIN DE: HCPCS | Performed by: STUDENT IN AN ORGANIZED HEALTH CARE EDUCATION/TRAINING PROGRAM

## 2024-04-04 PROCEDURE — 71000015 HC POSTOP RECOV 1ST HR: Performed by: STUDENT IN AN ORGANIZED HEALTH CARE EDUCATION/TRAINING PROGRAM

## 2024-04-04 DEVICE — IMPLANTABLE DEVICE: Type: IMPLANTABLE DEVICE | Site: EYE | Status: FUNCTIONAL

## 2024-04-04 RX ORDER — PHENYLEPHRINE HYDROCHLORIDE 100 MG/ML
SOLUTION/ DROPS OPHTHALMIC
Status: DISCONTINUED
Start: 2024-04-04 | End: 2024-04-04 | Stop reason: HOSPADM

## 2024-04-04 RX ORDER — LIDOCAINE HYDROCHLORIDE 10 MG/ML
INJECTION, SOLUTION EPIDURAL; INFILTRATION; INTRACAUDAL; PERINEURAL
Status: DISCONTINUED
Start: 2024-04-04 | End: 2024-04-04 | Stop reason: HOSPADM

## 2024-04-04 RX ORDER — MOXIFLOXACIN 5 MG/ML
1 SOLUTION/ DROPS OPHTHALMIC
Status: DISPENSED | OUTPATIENT
Start: 2024-04-04

## 2024-04-04 RX ORDER — PROPOFOL 10 MG/ML
VIAL (ML) INTRAVENOUS
Status: DISCONTINUED | OUTPATIENT
Start: 2024-04-04 | End: 2024-04-04

## 2024-04-04 RX ORDER — MOXIFLOXACIN 5 MG/ML
SOLUTION/ DROPS OPHTHALMIC
Status: DISCONTINUED | OUTPATIENT
Start: 2024-04-04 | End: 2024-04-04 | Stop reason: HOSPADM

## 2024-04-04 RX ORDER — LIDOCAINE HYDROCHLORIDE 40 MG/ML
INJECTION, SOLUTION RETROBULBAR
Status: DISCONTINUED | OUTPATIENT
Start: 2024-04-04 | End: 2024-04-04 | Stop reason: HOSPADM

## 2024-04-04 RX ORDER — LIDOCAINE HYDROCHLORIDE 10 MG/ML
1 INJECTION, SOLUTION EPIDURAL; INFILTRATION; INTRACAUDAL; PERINEURAL ONCE
Status: ACTIVE | OUTPATIENT
Start: 2024-04-04

## 2024-04-04 RX ORDER — CEFAZOLIN SODIUM 500 MG/2.2ML
INJECTION, POWDER, FOR SOLUTION INTRAMUSCULAR; INTRAVENOUS
Status: DISCONTINUED
Start: 2024-04-04 | End: 2024-04-04 | Stop reason: HOSPADM

## 2024-04-04 RX ORDER — PHENYLEPHRINE HYDROCHLORIDE 10 MG/ML
INJECTION INTRAVENOUS
Status: DISCONTINUED | OUTPATIENT
Start: 2024-04-04 | End: 2024-04-04

## 2024-04-04 RX ORDER — PROPARACAINE HYDROCHLORIDE 5 MG/ML
1 SOLUTION/ DROPS OPHTHALMIC
Status: DISPENSED | OUTPATIENT
Start: 2024-04-04

## 2024-04-04 RX ORDER — MIDAZOLAM HYDROCHLORIDE 1 MG/ML
INJECTION INTRAMUSCULAR; INTRAVENOUS
Status: DISCONTINUED | OUTPATIENT
Start: 2024-04-04 | End: 2024-04-04

## 2024-04-04 RX ORDER — PREDNISOLONE ACETATE 10 MG/ML
SUSPENSION/ DROPS OPHTHALMIC
Status: DISCONTINUED | OUTPATIENT
Start: 2024-04-04 | End: 2024-04-04 | Stop reason: HOSPADM

## 2024-04-04 RX ORDER — PREDNISOLONE ACETATE 10 MG/ML
SUSPENSION/ DROPS OPHTHALMIC
Status: DISCONTINUED
Start: 2024-04-04 | End: 2024-04-04 | Stop reason: HOSPADM

## 2024-04-04 RX ORDER — ATROPINE SULFATE 10 MG/ML
SOLUTION/ DROPS OPHTHALMIC
Status: DISCONTINUED | OUTPATIENT
Start: 2024-04-04 | End: 2024-04-04 | Stop reason: HOSPADM

## 2024-04-04 RX ORDER — GENTAMICIN 40 MG/ML
INJECTION, SOLUTION INTRAMUSCULAR; INTRAVENOUS
Status: DISCONTINUED
Start: 2024-04-04 | End: 2024-04-04 | Stop reason: HOSPADM

## 2024-04-04 RX ORDER — ATROPINE SULFATE 10 MG/ML
SOLUTION/ DROPS OPHTHALMIC
Status: DISCONTINUED
Start: 2024-04-04 | End: 2024-04-04 | Stop reason: HOSPADM

## 2024-04-04 RX ORDER — DEXAMETHASONE SODIUM PHOSPHATE 4 MG/ML
INJECTION, SOLUTION INTRA-ARTICULAR; INTRALESIONAL; INTRAMUSCULAR; INTRAVENOUS; SOFT TISSUE
Status: DISCONTINUED | OUTPATIENT
Start: 2024-04-04 | End: 2024-04-04

## 2024-04-04 RX ORDER — TROPICAMIDE 5 MG/ML
1 SOLUTION/ DROPS OPHTHALMIC ONCE
Status: COMPLETED | OUTPATIENT
Start: 2024-04-04 | End: 2024-04-04

## 2024-04-04 RX ORDER — SODIUM CHLORIDE 0.9 % (FLUSH) 0.9 %
3 SYRINGE (ML) INJECTION EVERY 30 MIN PRN
Status: DISCONTINUED | OUTPATIENT
Start: 2024-04-04 | End: 2024-04-04 | Stop reason: HOSPADM

## 2024-04-04 RX ORDER — LIDOCAINE HYDROCHLORIDE 40 MG/ML
INJECTION, SOLUTION RETROBULBAR
Status: DISCONTINUED
Start: 2024-04-04 | End: 2024-04-04 | Stop reason: HOSPADM

## 2024-04-04 RX ORDER — LIDOCAINE HYDROCHLORIDE 20 MG/ML
INJECTION INTRAVENOUS
Status: DISCONTINUED | OUTPATIENT
Start: 2024-04-04 | End: 2024-04-04

## 2024-04-04 RX ORDER — ACETAMINOPHEN 10 MG/ML
INJECTION, SOLUTION INTRAVENOUS
Status: DISCONTINUED | OUTPATIENT
Start: 2024-04-04 | End: 2024-04-04

## 2024-04-04 RX ORDER — SODIUM CHLORIDE 0.9 % (FLUSH) 0.9 %
10 SYRINGE (ML) INJECTION
Status: ACTIVE | OUTPATIENT
Start: 2024-04-04

## 2024-04-04 RX ORDER — ONDANSETRON HYDROCHLORIDE 2 MG/ML
INJECTION, SOLUTION INTRAVENOUS
Status: DISCONTINUED | OUTPATIENT
Start: 2024-04-04 | End: 2024-04-04

## 2024-04-04 RX ORDER — DEXAMETHASONE SODIUM PHOSPHATE 4 MG/ML
INJECTION, SOLUTION INTRA-ARTICULAR; INTRALESIONAL; INTRAMUSCULAR; INTRAVENOUS; SOFT TISSUE
Status: DISCONTINUED
Start: 2024-04-04 | End: 2024-04-04 | Stop reason: HOSPADM

## 2024-04-04 RX ORDER — OXYCODONE HYDROCHLORIDE 5 MG/1
5 TABLET ORAL ONCE AS NEEDED
Status: COMPLETED | OUTPATIENT
Start: 2024-04-04 | End: 2024-04-04

## 2024-04-04 RX ORDER — ACETAMINOPHEN 325 MG/1
650 TABLET ORAL EVERY 4 HOURS PRN
Status: DISCONTINUED | OUTPATIENT
Start: 2024-04-04 | End: 2024-04-04 | Stop reason: HOSPADM

## 2024-04-04 RX ORDER — FENTANYL CITRATE 50 UG/ML
INJECTION, SOLUTION INTRAMUSCULAR; INTRAVENOUS
Status: DISCONTINUED | OUTPATIENT
Start: 2024-04-04 | End: 2024-04-04

## 2024-04-04 RX ORDER — OXYCODONE HYDROCHLORIDE 5 MG/1
TABLET ORAL
Status: DISCONTINUED
Start: 2024-04-04 | End: 2024-04-04 | Stop reason: HOSPADM

## 2024-04-04 RX ORDER — LIDOCAINE HYDROCHLORIDE 10 MG/ML
INJECTION, SOLUTION EPIDURAL; INFILTRATION; INTRACAUDAL; PERINEURAL
Status: DISCONTINUED | OUTPATIENT
Start: 2024-04-04 | End: 2024-04-04 | Stop reason: HOSPADM

## 2024-04-04 RX ADMIN — PROPARACAINE HYDROCHLORIDE 1 DROP: 5 SOLUTION/ DROPS OPHTHALMIC at 10:04

## 2024-04-04 RX ADMIN — SODIUM CHLORIDE: 0.9 INJECTION, SOLUTION INTRAVENOUS at 10:04

## 2024-04-04 RX ADMIN — FENTANYL CITRATE 25 MCG: 50 INJECTION INTRAMUSCULAR; INTRAVENOUS at 11:04

## 2024-04-04 RX ADMIN — ACETAMINOPHEN 1000 MG: 10 INJECTION, SOLUTION INTRAVENOUS at 11:04

## 2024-04-04 RX ADMIN — PROPOFOL 150 MG: 10 INJECTION, EMULSION INTRAVENOUS at 11:04

## 2024-04-04 RX ADMIN — PHENYLEPHRINE HYDROCHLORIDE 100 MCG: 10 INJECTION INTRAVENOUS at 12:04

## 2024-04-04 RX ADMIN — PROPOFOL 30 MG: 10 INJECTION, EMULSION INTRAVENOUS at 11:04

## 2024-04-04 RX ADMIN — ONDANSETRON 4 MG: 2 INJECTION INTRAMUSCULAR; INTRAVENOUS at 12:04

## 2024-04-04 RX ADMIN — PROPOFOL 50 MG: 10 INJECTION, EMULSION INTRAVENOUS at 11:04

## 2024-04-04 RX ADMIN — PROPOFOL 60 MG: 10 INJECTION, EMULSION INTRAVENOUS at 11:04

## 2024-04-04 RX ADMIN — TROPICAMIDE 1 DROP: 5 SOLUTION/ DROPS OPHTHALMIC at 11:04

## 2024-04-04 RX ADMIN — MOXIFLOXACIN OPHTHALMIC 1 DROP: 5 SOLUTION/ DROPS OPHTHALMIC at 10:04

## 2024-04-04 RX ADMIN — MIDAZOLAM 1 MG: 1 INJECTION INTRAMUSCULAR; INTRAVENOUS at 11:04

## 2024-04-04 RX ADMIN — DEXAMETHASONE SODIUM PHOSPHATE 4 MG: 4 INJECTION INTRA-ARTICULAR; INTRALESIONAL; INTRAMUSCULAR; INTRAVENOUS; SOFT TISSUE at 11:04

## 2024-04-04 RX ADMIN — LIDOCAINE HYDROCHLORIDE 100 MG: 20 INJECTION INTRAVENOUS at 11:04

## 2024-04-04 RX ADMIN — TROPICAMIDE 1 DROP: 5 SOLUTION/ DROPS OPHTHALMIC at 10:04

## 2024-04-04 RX ADMIN — OXYCODONE 5 MG: 5 TABLET ORAL at 02:04

## 2024-04-04 NOTE — INTERVAL H&P NOTE
Patient seen and examined today, H&P reviewed.  There are no changes to the patient's H&P.  There is still an ongoing indication for the procedure.  Will proceed with anterior vitrectomy/glaucoma drainage device placement. All questions answered.    Yaw Manzo MD  [facility], PGY-2  04/04/2024  10:30 AM

## 2024-04-04 NOTE — OP NOTE
Operative Date:  04/04/2024    Discharge Date:  04/04/2024    Discharge Patient Home    SURGEON:  Mary Beck MD MS    ASSISTANT: Rosendo Manzo MD    PREOPERATIVE DIAGNOSIS: Poorly controlled advanced secondary open angle glaucoma of the left eye and vitreous prolapse, left eye    POSTOPERATIVE DIAGNOSIS: Poorly controlled advanced secondary open angle glaucoma of the left eye and vitreous prolapse, left eye    PROCEDURE PERFORMED: Anterior vitrectomy followed by Ahmed glaucoma shunt implant FP-7 with a pericardial patch graft in the superior temporal quadrant of the left eye    COMPLICATIONS: None.    ESTIMATED BLOOD LOSS: Minimal.    ANESTHESIA: GETA      COMPLICATIONS: None    ESTIMATED BLOOD LOSS: Minimal      PROCEDURE IN DETIAL: The patient and correct eye to be operated on were identified by the operating surgeon and the patient was brought into the operating room. The patient received general and topical anesthesia and then prepped and draped in the standard sterile fashion.    The Left Eye was first entered at the 12:00 oclock paracentesis and 0.1 ml of kenalog 40 mg/ml was injected in the anterior chamber. Vitreous prolapse around the nasal pupillary  margin was noted. Bimanual anterior vitrectomy in the  12:00 oclock paracentesis and 6 o'clock paracentesis site was achieved. The paracentesis sites were re-pressurized using BSS solution and both paracentesis sites were demonstrated to be watertight with Weck--Keily manipulation.      A superotemporal fornix-based conjunctival peritomy was performed with Vannas and Raphael scissor dissection.  The  implant was inspected and primed with a 30-gauge cannula on a BSS syringe. A sub-Tenons pocket was formed, and the shunt implant was then placed in the sub-Tenons space without difficulty.  Hemostasis was achieved with wet field cautery. The implant was fixed to the globe 9 to 10 mm posterior to the limbus using 8-0 Nylon.  The tubing was then cut to  size. The eye was first entered at the paracentesis site and then the eye was reentered to form a stent tract site using a 23-gauge butterfly needle.  The shunt tubing was placed in the anterior chamber in good position through this tract without difficulty.  The silicone stent was fixated to the globe using interrupted 8-0 Vicryl suture and a corneal patch graft was cut to shape, fitting well over the entrance site and tubing length and affixed to the globe with 8-0 Vicryl suture.  The conjunctiva was then secured to the limbus in a watertight fashion using 8-0 Vicryl sutures.  The anterior chamber was well formed throughout the case and there were no complications. Healon was injected in the anterior chamber. Following ancef and decadron were injected in the inferior fornix, then vigamox and prednisone 1% along with a drop atropine 1% was placed on the cornea followed by maxitrol ointment.  The eye was closed, patched, and a Burton shield was placed.  The patient was taken to the recovery room in good and stable condition.  The patient tolerated the procedure well.  The patient  was instructed to refrain from any heavy lifting, bending, stooping, or straining activities, discharge Home and to follow up in the morning for routine postoperative care.

## 2024-04-04 NOTE — ANESTHESIA PROCEDURE NOTES
Intubation    Date/Time: 4/4/2024 11:15 AM    Performed by: Nayeli Bangura CRNA  Authorized by: Rosa Juan MD    Intubation:     Induction:  Intravenous    Intubated:  Postinduction    Mask Ventilation:  Easy mask    Attempts:  1    Attempted By:  CRNA    Difficult Airway Encountered?: No      Complications:  None    Airway Device:  Supraglottic airway/LMA    Airway Device Size:  4.0    Placement Verified By:  Capnometry    Complicating Factors:  None    Findings Post-Intubation:  BS equal bilateral and atraumatic/condition of teeth unchanged

## 2024-04-04 NOTE — ANESTHESIA POSTPROCEDURE EVALUATION
Anesthesia Post Evaluation    Patient: Donald Enamorado    Procedure(s) Performed: Procedure(s) (LRB):  INSERTION, AQUEOUS SHUNT, EYE (Left)    Final Anesthesia Type: general      Patient location during evaluation: PACU  Patient participation: Yes- Able to Participate  Level of consciousness: awake and alert  Post-procedure vital signs: reviewed and stable  Pain management: adequate  Airway patency: patent    PONV status at discharge: No PONV  Anesthetic complications: no      Cardiovascular status: blood pressure returned to baseline  Respiratory status: unassisted, room air and spontaneous ventilation  Hydration status: euvolemic  Follow-up not needed.              Vitals Value Taken Time   /64 04/04/24 1446   Temp 36.7 °C (98 °F) 04/04/24 1445   Pulse 65 04/04/24 1447   Resp 20 04/04/24 1447   SpO2 100 % 04/04/24 1446   Vitals shown include unvalidated device data.      No case tracking events are documented in the log.      Pain/Ellen Score: Pain Rating Prior to Med Admin: 6 (4/4/2024  2:26 PM)  Pain Rating Post Med Admin: 4 (4/4/2024  3:00 PM)  Ellen Score: 10 (4/4/2024  3:00 PM)

## 2024-04-04 NOTE — DISCHARGE INSTRUCTIONS
If patched  Keep patch on eye until it is removed in clinic tomorrow  No lifting over 10 pounds  No bending, no straining  OK to shower, but do not get patch wet  If mild pain, take Tylenol 1000 mg  (do not exceed 4000 mg per day)  Call clinic if severe pain [phone] or  [phone]

## 2024-04-04 NOTE — TRANSFER OF CARE
"Anesthesia Transfer of Care Note    Patient: Donald Enamorado    Procedure(s) Performed: Procedure(s) (LRB):  INSERTION, AQUEOUS SHUNT, EYE (Left)    Patient location: [facility]    Anesthesia Type: general    Transport from OR: Transported from OR on room air with adequate spontaneous ventilation    Post pain: adequate analgesia    Post assessment: no apparent anesthetic complications and tolerated procedure well    Post vital signs: stable    Level of consciousness: awake and alert    Nausea/Vomiting: no nausea/vomiting    Complications: none    Transfer of care protocol was followed      Last vitals: Visit Vitals  BP (!) 160/86   Pulse 80   Temp 36.7 °C (98.1 °F) (Temporal)   Resp 14   Ht 5' 5" (1.651 m)   Wt 93 kg (205 lb)   SpO2 100%   BMI 34.11 kg/m²     "

## 2024-04-04 NOTE — ANESTHESIA PREPROCEDURE EVALUATION
04/04/2024  Donald Enamorado is a 73 y.o., male.    Past Medical History:   Diagnosis Date    Arthritis     Asthma     Osteoarthritis        Past Surgical History:   Procedure Laterality Date    ANKLE FUSION Right     KNEE ARTHROSCOPY      left leg surgery      TONSILLECTOMY      VITRECTOMY BY PARS PLANA APPROACH Left 8/2/2023    Procedure: VITRECTOMY, PARS PLANA APPROACH;  Surgeon: JANET Harris MD;  Location: [facility] OR [address] [place] [address];  Service: Ophthalmology;  Laterality: Left;  20 min           Pre-op Assessment          Review of Systems  Anesthesia Hx:  No problems with previous Anesthesia   History of prior surgery of interest to airway management or planning:          Denies Family Hx of Anesthesia complications.    Denies Personal Hx of Anesthesia complications.                    Cardiovascular:  Cardiovascular Normal                                            Pulmonary:    Denies COPD. Asthma (albuterol PRN- last used yesterday)    Denies Recent URI.                 Neurological:  Neurology Normal                                          Physical Exam  General: Alert, Oriented and Well nourished    Airway:  Mallampati: II   Mouth Opening: Normal  TM Distance: Normal  Neck ROM: Normal ROM    Dental:  Intact    Chest/Lungs:  Normal Respiratory Rate, Clear to auscultation    Heart:  Rate: Normal  Rhythm: Regular Rhythm        Anesthesia Plan  Type of Anesthesia, risks & benefits discussed:    Anesthesia Type: MAC, Gen Natural Airway, Gen Supraglottic Airway  Intra-op Monitoring Plan: Standard ASA Monitors  Post Op Pain Control Plan: multimodal analgesia and IV/PO Opioids PRN  Induction:  IV  Informed Consent: Informed consent signed with the Patient and all parties understand the risks and agree with anesthesia plan.  All questions answered.   ASA Score: 2  Day of Surgery Review of History & Physical:  H&P Update referred to the surgeon/provider.  Anesthesia Plan Notes: Pt instructed to use albuterol inhaler in preop before going to OR.      Ready For Surgery From Anesthesia Perspective.     .

## 2024-04-04 NOTE — DISCHARGE SUMMARY
[facility] - Surgery (1st Fl)  Discharge Note  Short Stay    Procedure(s) (LRB):  INSERTION, AQUEOUS SHUNT, EYE (Left)      OUTCOME: Patient tolerated treatment/procedure well without complication and is now ready for discharge.    DISPOSITION: Home or Self Care    FINAL DIAGNOSIS:  Secondary glaucoma, left, severe stage    FOLLOWUP: In clinic    DISCHARGE INSTRUCTIONS:    Discharge Procedure Orders   Diet general     Lifting restrictions   Order Comments: No lifting over 10 pounds.     Call MD for:  temperature >100.4     Call MD for:  persistent nausea and vomiting     Call MD for:  severe uncontrolled pain     Call MD for:  redness, tenderness, or signs of infection (pain, swelling, redness, odor or green/yellow discharge around incision site)        TIME SPENT ON DISCHARGE: 10 minutes

## 2024-04-04 NOTE — PLAN OF CARE
Pre-op checklist complete. Pending H&P update and site ramona. Vitals stable, no distress noted. Pre-op eyedrops administered per order. Wife at bedside.

## 2024-04-05 ENCOUNTER — OFFICE VISIT (OUTPATIENT)
Dept: OPHTHALMOLOGY | Facility: CLINIC | Age: 74
End: 2024-04-05
Payer: MEDICARE

## 2024-04-05 DIAGNOSIS — H40.52X3 SECONDARY GLAUCOMA, LEFT, SEVERE STAGE: Primary | ICD-10-CM

## 2024-04-05 DIAGNOSIS — Z96.1 PSEUDOPHAKIA OF BOTH EYES: ICD-10-CM

## 2024-04-05 DIAGNOSIS — H35.3132 INTERMEDIATE STAGE DRY AGE-RELATED MACULAR DEGENERATION OF BOTH EYES: ICD-10-CM

## 2024-04-05 DIAGNOSIS — H43.813 POSTERIOR VITREOUS DETACHMENT, BILATERAL: ICD-10-CM

## 2024-04-05 DIAGNOSIS — H21.562 AFFERENT PUPILLARY DEFECT OF LEFT EYE: ICD-10-CM

## 2024-04-05 PROCEDURE — 99212 OFFICE O/P EST SF 10 MIN: CPT | Mod: PBBFAC | Performed by: STUDENT IN AN ORGANIZED HEALTH CARE EDUCATION/TRAINING PROGRAM

## 2024-04-05 PROCEDURE — 99999 PR PBB SHADOW E&M-EST. PATIENT-LVL II: CPT | Mod: PBBFAC,,, | Performed by: STUDENT IN AN ORGANIZED HEALTH CARE EDUCATION/TRAINING PROGRAM

## 2024-04-05 PROCEDURE — 99024 POSTOP FOLLOW-UP VISIT: CPT | Mod: POP,,, | Performed by: STUDENT IN AN ORGANIZED HEALTH CARE EDUCATION/TRAINING PROGRAM

## 2024-04-05 NOTE — PROGRESS NOTES
Subjective:       Patient ID: Donald Enamorado is a 73 y.o. male.    Chief Complaint: Post-op Evaluation (1 day po)    HPI     Post-op Evaluation            Comments: 1 day po          Comments    Patient states the left eye is feeling well today. No pain. No discharge.    S/p  Anterior vitrectomy followed by Ahmed glaucoma shunt implant FP-7   with a corneal patch graft in the superior temporal quadrant of the left   eye- 4/4/2024          Last edited by Mary Beck MD on 4/5/2024 11:38 AM.               Assessment & Plan   Secondary glaucoma, left, severe stage    Intermediate stage dry age-related macular degeneration of both eyes    Afferent pupillary defect of left eye    Pseudophakia of both eyes    Posterior vitreous detachment, bilateral      POD#1  Anterior vitrectomy, then ST Ahmed glaucoma shunt implant FP-7 with corneal patch graft left eye  VA OK -  should improve  IOP OK - low but AC formed and no choroidals  Cornea some edema   Start eye drops in surgery eye   PF QID    Vigamox QID    Atropine BID   Zachariah qHS   AT throughout the day    Stop all other glc gtts   No lifting over 10 pounds x 6 weeks  No bending, no straining  OK to shower, but no water in the eye  Discussed if any worsening vision, worsening pain, discharge or drainage, call immediately  Post op handout given and all questions answered      // secondary open angle glaucoma severe with APD OS upon presentation  -Fhx (-), Steroids (+),Trauma(-)  -Drops: -Drop intolerance/contraindication: Rocklatan not effective per prior notes, Diamox made patient stomache   -Laser: SLT OS x 1 (2-3 years ago)   -Surgeries: CE IOL OU // PPV OS (steven 8/23) // Anterior vitrectomy OS  (Ebony 4/4/24)  // AVG OS Ebony (4/4/24)   -CCT: 613 // 619  -Gonio: SS OU  Tm 50s OS per pt    5/23 HVF 24-2  Full // SNS/INS hint SAD/IAD VFI 85% OS    5/23 RNFL Full // dec G B TS/NS/TI/NI OS  12/12 RNFL Full OD // dec TS B NS/G/TI OS        Vitreous in AC OS  S/p  PPV Dr. Harris 8/2/23   S/p anterior vitrectomy Dr. Beck 4/4/24       IOL subluxation OS  Return to Dr. Harris if dislocates or needs sutured     Pseudophakia OD  Lenses WC OD, monitor    ARMD  Intermediate. AREDS  Not using - educate and provide information     Posterior Vitreous Detachment  Retinal detachment precautions were reviewed the patient.    If any increase in flashing lights or floaters, the patient should return immediately to rule out retinal detachment.           PLAN  Post op drops as above    RTC 1 week VA, IOP      Mary Beck M.D., M.S.  Department of Ophthalmology   Division of Glaucoma Surgery  [facility]

## 2024-04-08 NOTE — PROGRESS NOTES
Subjective:       Patient ID: Donald Enamorado is a 73 y.o. male.    Chief Complaint: Post-op Evaluation     HPI    DLS: 4/5/2024, Dr. Beck    Pt presents today for 1 week PO. S/p Anterior vitrectomy followed by Ahmed   glaucoma shunt implant FP-7 with a corneal patch graft in the superior   temporal quadrant of the left eye: 4/4/2024. Pt states vision seems to be   coming along and mild eye pain occasionally.     Eye Meds:  PF QID OS  Vigamox QID OS   Atropine BID OS  Last edited by Santa Coffman MA on 4/9/2024 11:00 AM.              Assessment & Plan   Secondary glaucoma, left, severe stage    Intermediate stage dry age-related macular degeneration of both eyes    Afferent pupillary defect of left eye    Pseudophakia of both eyes    Posterior vitreous detachment, bilateral    Other orders  -     prednisoLONE acetate (PRED FORTE) 1 % DrpS; Place 1 drop into the left eye 4 (four) times daily.  Dispense: 15 mL; Refill: 0       POD#5  Anterior vitrectomy, then ST Ahmed glaucoma shunt implant FP-7 with corneal patch graft left eye  VA OK -  should improve  IOP OK   Cornea some edema   eye drops in surgery eye   PF QID    Vigamox QID --> STOP   Atropine BID --> STOP   Zachariah qHS   AT throughout the day    No other glc gtts   No lifting over 10 pounds x 6 weeks total  No bending, no straining  OK to shower, but no water in the eye  Discussed if any worsening vision, worsening pain, discharge or drainage, call immediately  Post op handout given and all questions answered      // secondary open angle glaucoma severe with APD OS upon presentation  -Fhx (-), Steroids (+),Trauma(-)  -Drops: -Drop intolerance/contraindication: Rocklatan not effective per prior notes, Diamox made patient stomache   -Laser: SLT OS x 1 (2-3 years ago)   -Surgeries: CE IOL OU // PPV OS (steven 8/23) // Anterior vitrectomy OS  (Ebony 4/4/24)  // AVG OS Ebony (4/4/24)   -CCT: 613 // 619  -Gonio: SS OU  Tm 50s OS per pt    5/23 HVF 24-2  Full  // SNS/INS hint SAD/IAD VFI 85% OS    5/23 RNFL Full // dec G B TS/NS/TI/NI OS  12/12 RNFL Full OD // dec TS B NS/G/TI OS        Vitreous in AC OS  S/p PPV Dr. Harris 8/2/23   S/p anterior vitrectomy Dr. Beck 4/4/24       IOL subluxation OS  Return to Dr. Harris if dislocates or needs sutured     Pseudophakia OD  Lenses WC OD, monitor    ARMD  Intermediate. AREDS  Not using - educate and provide information     Posterior Vitreous Detachment  Retinal detachment precautions were reviewed the patient.    If any increase in flashing lights or floaters, the patient should return immediately to rule out retinal detachment.           PLAN   PF QID OS  Refill provided    RTC 2 week VA, IOP        Mary Beck M.D., M.S.  Department of Ophthalmology   Division of Glaucoma Surgery  [facility]

## 2024-04-09 ENCOUNTER — OFFICE VISIT (OUTPATIENT)
Dept: OPHTHALMOLOGY | Facility: CLINIC | Age: 74
End: 2024-04-09
Payer: MEDICARE

## 2024-04-09 DIAGNOSIS — Z96.1 PSEUDOPHAKIA OF BOTH EYES: ICD-10-CM

## 2024-04-09 DIAGNOSIS — H35.3132 INTERMEDIATE STAGE DRY AGE-RELATED MACULAR DEGENERATION OF BOTH EYES: ICD-10-CM

## 2024-04-09 DIAGNOSIS — H40.52X3 SECONDARY GLAUCOMA, LEFT, SEVERE STAGE: Primary | ICD-10-CM

## 2024-04-09 DIAGNOSIS — H21.562 AFFERENT PUPILLARY DEFECT OF LEFT EYE: ICD-10-CM

## 2024-04-09 DIAGNOSIS — H43.813 POSTERIOR VITREOUS DETACHMENT, BILATERAL: ICD-10-CM

## 2024-04-09 PROCEDURE — 99212 OFFICE O/P EST SF 10 MIN: CPT | Mod: PBBFAC,PN | Performed by: STUDENT IN AN ORGANIZED HEALTH CARE EDUCATION/TRAINING PROGRAM

## 2024-04-09 PROCEDURE — 99024 POSTOP FOLLOW-UP VISIT: CPT | Mod: POP,,, | Performed by: STUDENT IN AN ORGANIZED HEALTH CARE EDUCATION/TRAINING PROGRAM

## 2024-04-09 PROCEDURE — 99999 PR PBB SHADOW E&M-EST. PATIENT-LVL II: CPT | Mod: PBBFAC,,, | Performed by: STUDENT IN AN ORGANIZED HEALTH CARE EDUCATION/TRAINING PROGRAM

## 2024-04-09 RX ORDER — PREDNISOLONE ACETATE 10 MG/ML
1 SUSPENSION/ DROPS OPHTHALMIC 4 TIMES DAILY
Qty: 15 ML | Refills: 0 | Status: SHIPPED | OUTPATIENT
Start: 2024-04-09 | End: 2024-05-09

## 2024-04-16 ENCOUNTER — TELEPHONE (OUTPATIENT)
Dept: OPHTHALMOLOGY | Facility: CLINIC | Age: 74
End: 2024-04-16
Payer: MEDICARE

## 2024-04-16 NOTE — TELEPHONE ENCOUNTER
----- Message from Mary Beck MD sent at 4/16/2024 11:56 AM CDT -----  Regarding: RE: Pt Inquiry  He can get it -- just needs an IOP check 5 weeks after his cortisone shot  ----- Message -----  From: Ania Raya  Sent: 4/15/2024   4:13 PM CDT  To: Mary Beck MD  Subject: FW: Pt Inquiry                                     ----- Message -----  From: Delisa Rhodes  Sent: 4/15/2024   1:31 PM CDT  To: [facility] Staff  Subject: Pt Inquiry                                       Patient called to about If getting cortisone shots is safe for his eyes?    Please call back to further [phone]

## 2024-04-22 NOTE — PROGRESS NOTES
Subjective:       Patient ID: Donald Enamorado is a 73 y.o. male.    Chief Complaint: Post-op Evaluation    HPI    DLS: 4/9/2024, Dr. Beck    Pt presents today for 2 week post op S/p Anterior vitrectomy followed by   Ahmed glaucoma shut implant FP-7 with a corneal patch graft in the   superior temporal quadrant of the left eye: 4/4/2024. Pt states vision has   improve since last visit.    Eye Meds:  PF QID  Last edited by Santa Coffman MA on 4/23/2024 11:17 AM.               Assessment & Plan   Secondary glaucoma, left, severe stage  -     timolol maleate 0.5% (TIMOPTIC) 0.5 % Drop; Place 1 drop into the left eye every morning.  Dispense: 15 mL; Refill: 12    Intermediate stage dry age-related macular degeneration of both eyes  -     timolol maleate 0.5% (TIMOPTIC) 0.5 % Drop; Place 1 drop into the left eye every morning.  Dispense: 15 mL; Refill: 12    Afferent pupillary defect of left eye  -     timolol maleate 0.5% (TIMOPTIC) 0.5 % Drop; Place 1 drop into the left eye every morning.  Dispense: 15 mL; Refill: 12    Pseudophakia of both eyes  -     timolol maleate 0.5% (TIMOPTIC) 0.5 % Drop; Place 1 drop into the left eye every morning.  Dispense: 15 mL; Refill: 12      POW#3  Anterior vitrectomy, then ST Ahmed glaucoma shunt implant FP-7 with corneal patch graft left eye  VA OK -  should improve  IOP OK   Cornea clear  eye drops in surgery eye   PF QID --> change to TID on 5/4/2024   Start early aqueous suppression qAM OS  No lifting over 10 pounds x 6 weeks total  No bending, no straining  OK to shower, but no water in the eye  Discussed if any worsening vision, worsening pain, discharge or drainage, call immediately  Post op handout given and all questions answered        // secondary open angle glaucoma severe with APD OS upon presentation  -Fhx (-), Steroids (+),Trauma(-)  -Drops: -Drop intolerance/contraindication: Rocklatan not effective per prior notes, Diamox made patient stomache   -Laser: SLT OS x  1 (2-3 years ago)   -Surgeries: CE IOL OU // PPV OS (steven 8/23) // Anterior vitrectomy OS  (Ebony 4/4/24)  // AVG OS Ebony (4/4/24)   -CCT: 613 // 619  -Gonio: SS OU  Tm 50s OS per pt    5/23 HVF 24-2  Full // SNS/INS hint SAD/IAD VFI 85% OS    5/23 RNFL Full // dec G B TS/NS/TI/NI OS  12/12 RNFL Full OD // dec TS B NS/G/TI OS        Vitreous in AC OS  S/p PPV Dr. Harris 8/2/23   S/p anterior vitrectomy Dr. Beck 4/4/24       IOL subluxation OS  Return to Dr. Harris if dislocates or needs sutured     Pseudophakia OD  Lenses WC OD, monitor    ARMD  Intermediate. AREDS  Not using - educate and provide information     Posterior Vitreous Detachment  Retinal detachment precautions were reviewed the patient.    If any increase in flashing lights or floaters, the patient should return immediately to rule out retinal detachment.           PLAN  PF QID OS --> change to TID next week  Refill provided    RTC 3  week VA, IOP       Mary Beck M.D., M.S.  Department of Ophthalmology   Division of Glaucoma Surgery  [facility]

## 2024-04-23 ENCOUNTER — OFFICE VISIT (OUTPATIENT)
Dept: OPHTHALMOLOGY | Facility: CLINIC | Age: 74
End: 2024-04-23
Payer: MEDICARE

## 2024-04-23 DIAGNOSIS — H35.3132 INTERMEDIATE STAGE DRY AGE-RELATED MACULAR DEGENERATION OF BOTH EYES: ICD-10-CM

## 2024-04-23 DIAGNOSIS — Z96.1 PSEUDOPHAKIA OF BOTH EYES: ICD-10-CM

## 2024-04-23 DIAGNOSIS — H21.562 AFFERENT PUPILLARY DEFECT OF LEFT EYE: ICD-10-CM

## 2024-04-23 DIAGNOSIS — H40.52X3 SECONDARY GLAUCOMA, LEFT, SEVERE STAGE: Primary | ICD-10-CM

## 2024-04-23 PROCEDURE — 99024 POSTOP FOLLOW-UP VISIT: CPT | Mod: POP,,, | Performed by: STUDENT IN AN ORGANIZED HEALTH CARE EDUCATION/TRAINING PROGRAM

## 2024-04-23 PROCEDURE — 99999 PR PBB SHADOW E&M-EST. PATIENT-LVL II: CPT | Mod: PBBFAC,,, | Performed by: STUDENT IN AN ORGANIZED HEALTH CARE EDUCATION/TRAINING PROGRAM

## 2024-04-23 PROCEDURE — 99212 OFFICE O/P EST SF 10 MIN: CPT | Mod: PBBFAC,PN | Performed by: STUDENT IN AN ORGANIZED HEALTH CARE EDUCATION/TRAINING PROGRAM

## 2024-04-23 RX ORDER — TIMOLOL MALEATE 5 MG/ML
1 SOLUTION/ DROPS OPHTHALMIC EVERY MORNING
Qty: 15 ML | Refills: 12 | Status: SHIPPED | OUTPATIENT
Start: 2024-04-23 | End: 2025-04-23

## 2024-05-06 NOTE — PROGRESS NOTES
Subjective:       Patient ID: Donald Enamorado is a 73 y.o. male.    Chief Complaint: Glaucoma    HPI    DLS: 4/23/2024, Dr. Beck    Pt present today for IOP check. S/p Anterior vitrectomy followed by Ahmed   glaucoma shut implant FP-7 with a corneal patch graft in the superior   temporal quadrant of the left eye: 4/4/2024. Pt states no vision or ocular   complaints since last visit.     Eye Meds:  PF TID OS  Timolol QD OS  Last edited by Mary Beck MD on 5/7/2024 12:00 PM.               Assessment & Plan   Secondary glaucoma, left, severe stage    Intermediate stage dry age-related macular degeneration of both eyes    Afferent pupillary defect of left eye    Pseudophakia of both eyes    Posterior vitreous detachment, bilateral    Arthritis of carpometacarpal (CMC) joint of left thumb      POW#4  Anterior vitrectomy, then ST Ahmed glaucoma shunt implant FP-7 with corneal patch graft left eye  VA improving  IOP great  Cornea clear  Has some mucous around the stitches that are dissolving - OK   eye drops in surgery eye   PF TID on 5/4/2024   Cont early aqueous suppression qAM OS  No lifting over 10 pounds x 6 weeks total -- 1.5 weeks later  No bending, no straining  OK to shower, but no water in the eye  Discussed if any worsening vision, worsening pain, discharge or drainage, call immediately  Post op handout given and all questions answered        // secondary open angle glaucoma severe with APD OS upon presentation  -Fhx (-), Steroids (+),Trauma(-)  -Drops: -Drop intolerance/contraindication: Rocklatan not effective per prior notes, Diamox made patient stomache   -Laser: SLT OS x 1 (2-3 years ago)   -Surgeries: CE IOL OU // PPV OS (steven 8/23) // Anterior vitrectomy OS  (Ebony 4/4/24)  // AVG OS Ebony (4/4/24)   -CCT: 613 // 619  -Gonio: SS OU  Tm 50s OS per pt    5/23 HVF 24-2  Full // SNS/INS hint SAD/IAD VFI 85% OS    5/23 RNFL Full // dec G B TS/NS/TI/NI OS  12/12 RNFL Full OD // dec TS B NS/G/TI  OS        Vitreous in AC OS  S/p PPV Dr. Harris 8/2/23   S/p anterior vitrectomy Dr. Beck 4/4/24       IOL subluxation OS  Return to Dr. Harris if dislocates or needs sutured     Pseudophakia OD  Lenses WC OD, monitor    ARMD  Intermediate. AREDS  Not using - educate and provide information     Posterior Vitreous Detachment  Retinal detachment precautions were reviewed the patient.    If any increase in flashing lights or floaters, the patient should return immediately to rule out retinal detachment.           PLAN  PF TID OS --> need to change to BID next visit   Timolol qAM OS    RTC 3  week VA, IOP       Mary Beck M.D., M.S.  Department of Ophthalmology   Division of Glaucoma Surgery  [facility]

## 2024-05-07 ENCOUNTER — OFFICE VISIT (OUTPATIENT)
Dept: OPHTHALMOLOGY | Facility: CLINIC | Age: 74
End: 2024-05-07
Payer: MEDICARE

## 2024-05-07 DIAGNOSIS — M18.12 ARTHRITIS OF CARPOMETACARPAL (CMC) JOINT OF LEFT THUMB: ICD-10-CM

## 2024-05-07 DIAGNOSIS — H40.52X3 SECONDARY GLAUCOMA, LEFT, SEVERE STAGE: Primary | ICD-10-CM

## 2024-05-07 DIAGNOSIS — H21.562 AFFERENT PUPILLARY DEFECT OF LEFT EYE: ICD-10-CM

## 2024-05-07 DIAGNOSIS — H43.813 POSTERIOR VITREOUS DETACHMENT, BILATERAL: ICD-10-CM

## 2024-05-07 DIAGNOSIS — H35.3132 INTERMEDIATE STAGE DRY AGE-RELATED MACULAR DEGENERATION OF BOTH EYES: ICD-10-CM

## 2024-05-07 DIAGNOSIS — Z96.1 PSEUDOPHAKIA OF BOTH EYES: ICD-10-CM

## 2024-05-07 PROCEDURE — 99999 PR PBB SHADOW E&M-EST. PATIENT-LVL III: CPT | Mod: PBBFAC,,, | Performed by: STUDENT IN AN ORGANIZED HEALTH CARE EDUCATION/TRAINING PROGRAM

## 2024-05-07 PROCEDURE — 99024 POSTOP FOLLOW-UP VISIT: CPT | Mod: POP,,, | Performed by: STUDENT IN AN ORGANIZED HEALTH CARE EDUCATION/TRAINING PROGRAM

## 2024-05-07 PROCEDURE — 99213 OFFICE O/P EST LOW 20 MIN: CPT | Mod: PBBFAC,PN | Performed by: STUDENT IN AN ORGANIZED HEALTH CARE EDUCATION/TRAINING PROGRAM

## 2024-05-28 ENCOUNTER — OFFICE VISIT (OUTPATIENT)
Dept: OPHTHALMOLOGY | Facility: CLINIC | Age: 74
End: 2024-05-28
Payer: MEDICARE

## 2024-05-28 DIAGNOSIS — H35.3132 INTERMEDIATE STAGE DRY AGE-RELATED MACULAR DEGENERATION OF BOTH EYES: ICD-10-CM

## 2024-05-28 DIAGNOSIS — H21.562 AFFERENT PUPILLARY DEFECT OF LEFT EYE: ICD-10-CM

## 2024-05-28 DIAGNOSIS — H43.813 POSTERIOR VITREOUS DETACHMENT, BILATERAL: ICD-10-CM

## 2024-05-28 DIAGNOSIS — Z96.1 PSEUDOPHAKIA OF BOTH EYES: ICD-10-CM

## 2024-05-28 DIAGNOSIS — H40.52X3 SECONDARY GLAUCOMA, LEFT, SEVERE STAGE: Primary | ICD-10-CM

## 2024-05-28 PROCEDURE — 99999 PR PBB SHADOW E&M-EST. PATIENT-LVL III: CPT | Mod: PBBFAC,,, | Performed by: STUDENT IN AN ORGANIZED HEALTH CARE EDUCATION/TRAINING PROGRAM

## 2024-05-28 PROCEDURE — 99213 OFFICE O/P EST LOW 20 MIN: CPT | Mod: PBBFAC,PN | Performed by: STUDENT IN AN ORGANIZED HEALTH CARE EDUCATION/TRAINING PROGRAM

## 2024-05-28 PROCEDURE — 99024 POSTOP FOLLOW-UP VISIT: CPT | Mod: POP,,, | Performed by: STUDENT IN AN ORGANIZED HEALTH CARE EDUCATION/TRAINING PROGRAM

## 2024-05-28 NOTE — PATIENT INSTRUCTIONS
1) Pink top prednisolone acetate   -change to twice a day --> On 6/28/24 change to once a day      2) Yellow top timolol - continue once a day

## 2024-05-28 NOTE — PROGRESS NOTES
Subjective:       Patient ID: Donald Enamorado is a 73 y.o. male.    Chief Complaint: Glaucoma    HPI    DLS: 5/7/24 w/ Dr. Beck    S/p Anterior vitrectomy followed by Ahmed glaucoma shut implant FP-7 with   a corneal patch graft in the superior temporal quadrant of the left eye:   4/4/2024     73 y.o. male presents for IOP Check. Patient reports some improvement to   VA OS. Has floaters OD, no change. Complains of double vision in temporal   periphery OS when looking left. Denies pain, headaches, and flashes.     Eye Meds:  PF TID OS  Timolol QD OS  Last edited by Ania Raya on 5/28/2024  3:10 PM.               Assessment & Plan   Secondary glaucoma, left, severe stage    Intermediate stage dry age-related macular degeneration of both eyes    Afferent pupillary defect of left eye    Pseudophakia of both eyes    Posterior vitreous detachment, bilateral      POW#7  Anterior vitrectomy, then ST Ahmed glaucoma shunt implant FP-7 with corneal patch graft left eye  VA improving  IOP great  Cornea clear  Has some mucous around the stitches that are dissolving - OK   eye drops in surgery eye   PF change to BID x 1 month and then change to daily  Cont early aqueous suppression timolol qAM OS  Return to normal activities           // secondary open angle glaucoma severe with APD OS upon presentation  -Fhx (-), Steroids (+),Trauma(-)  -Drops:  --> surgery gdd -->  timolol qAM OS  -Drop intolerance/contraindication: Rocklatan not effective per prior notes, Diamox made patient stomache   -Laser: SLT OS x 1 (2-3 years ago)   -Surgeries: CE IOL OU // PPV OS (steven 8/23) // Anterior vitrectomy with AVG OS  (Ebony 4/4/24)   -CCT: 613 // 619  -Gonio: SS OU  Tm 50s OS per pt    5/23 HVF 24-2  Full // SNS/INS hint SAD/IAD VFI 85% OS    5/23 RNFL Full // dec G B TS/NS/TI/NI OS  12/12 RNFL Full OD // dec TS B NS/G/TI OS        Vitreous in AC OS  S/p PPV Dr. Harris 8/2/23   S/p anterior vitrectomy Dr. Beck 4/4/24       IOL  subluxation OS  Return to Dr. Harris if dislocates or needs sutured     Pseudophakia OD  Lenses WC OD, monitor    ARMD  Intermediate. AREDS  Not using - educate and provide information     Posterior Vitreous Detachment  Retinal detachment precautions were reviewed the patient.    If any increase in flashing lights or floaters, the patient should return immediately to rule out retinal detachment.           PLAN  PF change to BID OS x 1 month then to daily   Timolol qAM OS    RTC 2 month IOP check and steroid taper --> main     Look at glaucoma drainage implant for healing         Mary Beck M.D., M.S.  Department of Ophthalmology   Division of Glaucoma Surgery  [facility]

## 2024-07-27 NOTE — PROGRESS NOTES
HPI    DLS: 25/28/2024 With Dr. Liu    Pt here for IOP Check per Dr. Liu;  Pt states a little pain to her eyes but feels like he has double vision to   his OS. Pt states he stopped using the Pred Forte eye drops on 7/27/2024.     Meds;  Timolol QDAY OS ONLY      Last edited by Ayanna Christiansen on 7/30/2024  3:36 PM.          Old pt of Dr LIU   ?? H/o ANT VITTECTOMY AND GDD - AHMED OS - 4/4/2024- [facility]         Assessment & Plan   Secondary glaucoma, left, severe stage    Afferent pupillary defect of left eye    Intermediate stage dry age-related macular degeneration of both eyes    Vitreous in anterior chamber of left eye    Posterior vitreous detachment, bilateral    Lens subluxation, left      POM 3  Anterior vitrectomy, then ST Ahmed glaucoma shunt implant FP-7 with corneal patch graft left eye  VA improving  IOP great - 12   Cornea clear  timolol qAM OS  Return to normal activities       // secondary open angle glaucoma severe with APD OS upon presentation  -Fhx (-), Steroids (+),Trauma(-)  -Drops:  --> surgery gdd -->  timolol qAM OS  -Drop intolerance/contraindication: Rocklatan not effective per prior notes, Diamox made patient stomache   -Laser: SLT OS x 1 (2-3 years ago)   -Surgeries: CE IOL OU // PPV OS (steven 8/23) // Anterior vitrectomy with AVG OS  (Ebony 4/4/24)   -CCT: 613 // 619  -Gonio: SS OU  Tm 50s OS per pt    5/23 HVF 24-2  Full // SNS/INS hint SAD/IAD VFI 85% OS    5/23 RNFL Full // dec G B TS/NS/TI/NI OS  12/12 RNFL Full OD // dec TS B NS/G/TI OS    H/O   Vitreous in AC OS  S/p PPV Dr. Harris 8/2/23   S/p anterior vitrectomy Dr. Liu 4/4/24       IOL subluxation OS  Return to Dr. Harris if dislocates or needs sutured     Pseudophakia OD  Lenses WC OD, monitor    ARMD  Intermediate. AREDS  Not using - educate and provide information     Posterior Vitreous Detachment  Retinal detachment precautions were reviewed the patient.    If any increase in flashing lights or floaters, the  "patient should return immediately to rule out retinal detachment.      PLAN  IOP great post ahmed os   Cont Timolol qAM OS  F/U 4 months with HVF / DFE / OCT     If the double vision is constant or there most of the time can consult Dr JANET Brown   Pt use to have an XT as a child - corrected with fusion exercised ( Rx at the [facility] with "pencil pushups"     Ok to see Dr Bola basurto and for refraction ( he did the CE and then had the vit prolapse and 2nd glaucoma)         "

## 2024-07-30 ENCOUNTER — OFFICE VISIT (OUTPATIENT)
Dept: OPHTHALMOLOGY | Facility: CLINIC | Age: 74
End: 2024-07-30
Payer: MEDICARE

## 2024-07-30 DIAGNOSIS — H27.112 LENS SUBLUXATION, LEFT: ICD-10-CM

## 2024-07-30 DIAGNOSIS — H43.813 POSTERIOR VITREOUS DETACHMENT, BILATERAL: ICD-10-CM

## 2024-07-30 DIAGNOSIS — H21.562 AFFERENT PUPILLARY DEFECT OF LEFT EYE: ICD-10-CM

## 2024-07-30 DIAGNOSIS — H40.52X3 SECONDARY GLAUCOMA, LEFT, SEVERE STAGE: Primary | ICD-10-CM

## 2024-07-30 DIAGNOSIS — H35.3132 INTERMEDIATE STAGE DRY AGE-RELATED MACULAR DEGENERATION OF BOTH EYES: ICD-10-CM

## 2024-07-30 DIAGNOSIS — H43.02 VITREOUS IN ANTERIOR CHAMBER OF LEFT EYE: ICD-10-CM

## 2024-07-30 PROCEDURE — 99214 OFFICE O/P EST MOD 30 MIN: CPT | Mod: S$PBB,,, | Performed by: OPHTHALMOLOGY

## 2024-07-30 PROCEDURE — 99212 OFFICE O/P EST SF 10 MIN: CPT | Mod: PBBFAC,PO,25 | Performed by: OPHTHALMOLOGY

## 2024-07-30 PROCEDURE — 92020 GONIOSCOPY: CPT | Mod: PBBFAC,PO | Performed by: OPHTHALMOLOGY

## 2024-07-30 PROCEDURE — 99999 PR PBB SHADOW E&M-EST. PATIENT-LVL II: CPT | Mod: PBBFAC,,, | Performed by: OPHTHALMOLOGY

## 2024-07-30 PROCEDURE — 92020 GONIOSCOPY: CPT | Mod: S$PBB,,, | Performed by: OPHTHALMOLOGY

## 2024-09-22 NOTE — PROGRESS NOTES
HPI    DLS: 7/30/2024    Pt c/o OS being very sensitive to light which started a couple of weeks   ago and he thought that would go away but instead the pain got worse, red,   tearing and swollen, was having double vision but not bad now feels like   its correcting itself.     Meds;  Timolol QDAY OS ONLY      Last edited by Ayanna Christiansen on 9/24/2024  1:46 PM.            Assessment /Plan     For exam results, see Encounter Report.    Acute iritis, left eye  -     prednisoLONE acetate (PRED FORTE) 1 % DrpS; Place 1 drop into the left eye 4 (four) times daily.  Dispense: 5 mL; Refill: 1  -     atropine 1% (ISOPTO ATROPINE) 1 % Drop; Place 1 drop into the left eye 2 (two) times daily.  Dispense: 5 mL; Refill: 0    Secondary glaucoma, left, severe stage    Afferent pupillary defect of left eye    Intermediate stage dry age-related macular degeneration of both eyes    Vitreous in anterior chamber of left eye    Posterior vitreous detachment, bilateral    Lens subluxation, left          UCARE pain left eye     Acute iritis os       POM 3  Anterior vitrectomy, then ST Ahmed glaucoma shunt implant FP-7 with corneal patch graft left eye  VA improving  IOP great - 12   Cornea clear  timolol qAM OS  Return to normal activities       // secondary open angle glaucoma severe with APD OS upon presentation  -Fhx (-), Steroids (+),Trauma(-)  -Drops:  --> surgery gdd -->  timolol qAM OS  -Drop intolerance/contraindication: Rocklatan not effective per prior notes, Diamox made patient stomache   -Laser: SLT OS x 1 (2-3 years ago)   -Surgeries: CE IOL OU // PPV OS (steven 8/23) // Anterior vitrectomy with AVG OS  (E[facility] 4/4/24)   -CCT: 613 // 619  -Gonio: SS OU  Tm 50s OS per pt    5/23 HVF 24-2  Full // SNS/INS hint SAD/IAD VFI 85% OS    5/23 RNFL Full // dec G B TS/NS/TI/NI OS  12/12 RNFL Full OD // dec TS B NS/G/TI OS    H/O   Vitreous in AC OS  S/p PPV Dr. Harris 8/2/23   S/p anterior vitrectomy Dr. Beck 4/4/24       IOL  "subluxation OS  Return to Dr. Harris if dislocates or needs sutured     Pseudophakia OD  Lenses WC OD, monitor    ARMD  Intermediate. AREDS  Not using - educate and provide information     Posterior Vitreous Detachment  Retinal detachment precautions were reviewed the patient.    If any increase in flashing lights or floaters, the patient should return immediately to rule out retinal detachment.      PLAN  IOP great post ahmed os   Cont Timolol qAM OS  F/U 4 months with HVF / DFE / OCT     If the double vision is constant or there most of the time can consult Dr JANET Brown   Pt use to have an XT as a child - corrected with fusion exercised ( [facility] with "pencil pushups"     Ok to see Dr Bola basurto and for refraction ( he did the CE and then had the vit prolapse and 2nd glaucoma)     Urgent care 9/24/2024   + iritis os   Start PA qod os   Atropine bid os   Cont timolol 1 x day os     F/U 2-4 weeks - iritis check - can get OCT macula   Keep appt in Nov for HVF and DFE and OCT   "

## 2024-09-24 ENCOUNTER — OFFICE VISIT (OUTPATIENT)
Dept: OPHTHALMOLOGY | Facility: CLINIC | Age: 74
End: 2024-09-24
Payer: MEDICARE

## 2024-09-24 DIAGNOSIS — H43.02 VITREOUS IN ANTERIOR CHAMBER OF LEFT EYE: ICD-10-CM

## 2024-09-24 DIAGNOSIS — H40.52X3 SECONDARY GLAUCOMA, LEFT, SEVERE STAGE: ICD-10-CM

## 2024-09-24 DIAGNOSIS — H20.00 ACUTE IRITIS, LEFT EYE: Primary | ICD-10-CM

## 2024-09-24 DIAGNOSIS — H27.112 LENS SUBLUXATION, LEFT: ICD-10-CM

## 2024-09-24 DIAGNOSIS — H35.3132 INTERMEDIATE STAGE DRY AGE-RELATED MACULAR DEGENERATION OF BOTH EYES: ICD-10-CM

## 2024-09-24 DIAGNOSIS — H43.813 POSTERIOR VITREOUS DETACHMENT, BILATERAL: ICD-10-CM

## 2024-09-24 DIAGNOSIS — H21.562 AFFERENT PUPILLARY DEFECT OF LEFT EYE: ICD-10-CM

## 2024-09-24 PROCEDURE — 99214 OFFICE O/P EST MOD 30 MIN: CPT | Mod: S$PBB,,, | Performed by: OPHTHALMOLOGY

## 2024-09-24 PROCEDURE — 99999 PR PBB SHADOW E&M-EST. PATIENT-LVL III: CPT | Mod: PBBFAC,,, | Performed by: OPHTHALMOLOGY

## 2024-09-24 PROCEDURE — 99213 OFFICE O/P EST LOW 20 MIN: CPT | Mod: PBBFAC,PO | Performed by: OPHTHALMOLOGY

## 2024-09-24 RX ORDER — PREDNISOLONE ACETATE 10 MG/ML
1 SUSPENSION/ DROPS OPHTHALMIC 4 TIMES DAILY
Qty: 5 ML | Refills: 1 | Status: SHIPPED | OUTPATIENT
Start: 2024-09-24

## 2024-09-24 RX ORDER — ATROPINE SULFATE 10 MG/ML
1 SOLUTION/ DROPS OPHTHALMIC 2 TIMES DAILY
Qty: 5 ML | Refills: 0 | Status: SHIPPED | OUTPATIENT
Start: 2024-09-24

## 2024-10-17 NOTE — PROGRESS NOTES
HPI    DLS: 9/24/2024    Pt here for Iritis Check/OCT mac:  Pt states no eye pain or discomfort. Pt states he feels like the atropine   eye drops makes his vision blurry.     Meds;  Timolol QDAY OS ONLY  PA QID OS  Atropine BID OS    1. Iritis OS  2.secondary open angle glaucoma OS   3. + APD OS   4. Dry armd ou  5. H/O vit in AC - ant vit - Dr Beck 4/4/2024  6. H/O PPVx  OS - Steven - 8/2/2013      Last edited by Jayla Han MD on 10/22/2024  2:25 PM.            Assessment /Plan     For exam results, see Encounter Report.    Acute iritis, left eye    Secondary glaucoma, left, severe stage    Afferent pupillary defect of left eye    Intermediate stage dry age-related macular degeneration of both eyes    Vitreous in anterior chamber of left eye    Posterior vitreous detachment, bilateral    Lens subluxation, left    Pseudophakia of both eyes        [facility] pain left eye // [facility] PT -- Acute iritis os / 9/24/2024   F/U IRITIS OS - 9/24/2024       S/P ahmed and ant vit os - 4/4/2024 - [facility]   VA improving  IOP 16/16   Cornea clear  timolol qAM OS    // secondary open angle glaucoma severe with APD OS upon presentation  -Fhx (-), Steroids (+),Trauma(-)  -Drops:  --> surgery gdd -->  timolol qAM OS   -Drop intolerance/contraindication: Rocklatan not effective per prior notes, Diamox made patient stomache   -Laser: SLT OS x 1 (2-3 years ago)   -Surgeries: CE IOL OU // PPV OS (steven 8/23) // Anterior vitrectomy with AVG OS  ([facility] 4/4/24)   -CCT: 613 // 619  -Gonio: SS OU  Tm 50s OS per pt    5/23 HVF 24-2  Full // SNS/INS hint SAD/IAD VFI 85% OS    5/23 RNFL Full // dec G B TS/NS/TI/NI OS  12/12 RNFL Full OD // dec TS B NS/G/TI OS    H/O   Vitreous in AC OS  S/p PPV Dr. Harris 8/2/23   S/p anterior vitrectomy Dr. Beck 4/4/24     IOL subluxation OS  Return to Dr. Harris if dislocates or needs sutured     Pseudophakia OD  Lenses WC OD, monitor    ARMD  Intermediate. AREDS  Not using - educate and provide  "information     Posterior Vitreous Detachment  Retinal detachment precautions were reviewed the patient.    If any increase in flashing lights or floaters, the patient should return immediately to rule out retinal detachment.      PLAN  IOP great post ahmed os   Cont Timolol qAM OS  F/U 4 months with HVF / DFE / OCT     If the double vision is constant or there most of the time can consult Dr JANET Brown   Pt use to have an XT as a child - corrected with fusion exercised ( [facility] with "pencil pushups"     Ok to see Dr Bola basurto and for refraction ( he did the CE and then had the vit prolapse and 2nd glaucoma)     Urgent care 9/24/2024   + iritis os   Start PA qId os   Atropine bid os   Cont timolol 1 x day os     10/27/2024   IRITIS IMPROVED - EYE PAIN FREE OS   ADJUST GTTS - OS  PA tid x 2 wks , then bid till nex visit   Atropine 1 x day at night - stop when runs out   Timolol q am     Keep appt in Nov for HVF and DFE and OCT   "

## 2024-10-22 ENCOUNTER — OFFICE VISIT (OUTPATIENT)
Dept: OPHTHALMOLOGY | Facility: CLINIC | Age: 74
End: 2024-10-22
Payer: MEDICARE

## 2024-10-22 DIAGNOSIS — H21.562 AFFERENT PUPILLARY DEFECT OF LEFT EYE: ICD-10-CM

## 2024-10-22 DIAGNOSIS — Z96.1 PSEUDOPHAKIA OF BOTH EYES: ICD-10-CM

## 2024-10-22 DIAGNOSIS — H35.3132 INTERMEDIATE STAGE DRY AGE-RELATED MACULAR DEGENERATION OF BOTH EYES: ICD-10-CM

## 2024-10-22 DIAGNOSIS — H27.112 LENS SUBLUXATION, LEFT: ICD-10-CM

## 2024-10-22 DIAGNOSIS — H43.02 VITREOUS IN ANTERIOR CHAMBER OF LEFT EYE: ICD-10-CM

## 2024-10-22 DIAGNOSIS — H40.52X3 SECONDARY GLAUCOMA, LEFT, SEVERE STAGE: ICD-10-CM

## 2024-10-22 DIAGNOSIS — H43.813 POSTERIOR VITREOUS DETACHMENT, BILATERAL: ICD-10-CM

## 2024-10-22 DIAGNOSIS — H20.00 ACUTE IRITIS, LEFT EYE: Primary | ICD-10-CM

## 2024-10-22 PROCEDURE — 92134 CPTRZ OPH DX IMG PST SGM RTA: CPT | Mod: PBBFAC,PO | Performed by: OPHTHALMOLOGY

## 2024-10-22 PROCEDURE — 99213 OFFICE O/P EST LOW 20 MIN: CPT | Mod: PBBFAC,PO,25 | Performed by: OPHTHALMOLOGY

## 2024-10-22 PROCEDURE — 99214 OFFICE O/P EST MOD 30 MIN: CPT | Mod: S$PBB,,, | Performed by: OPHTHALMOLOGY

## 2024-10-22 PROCEDURE — 99999 PR PBB SHADOW E&M-EST. PATIENT-LVL III: CPT | Mod: PBBFAC,,, | Performed by: OPHTHALMOLOGY

## 2024-10-22 RX ORDER — PREDNISOLONE ACETATE 10 MG/ML
1 SUSPENSION/ DROPS OPHTHALMIC 2 TIMES DAILY
Qty: 5 ML | Refills: 1 | Status: SHIPPED | OUTPATIENT
Start: 2024-10-22

## 2024-11-10 NOTE — PROGRESS NOTES
HPI     Glaucoma            Comments: HVF and OCT review today and pt states no changes since last   exam           Comments    DLS:10/22/24    1. Severe Secondary OAG OS  2. APD OS  3. Iritis OS  4. IOL Subluxation OS  4. PCIOL OD  5. ARMD   6. PVD OU    MEDS:  Timolol QAM OS  PA TID OS          Last edited by Marry Kinney MA on 11/12/2024  2:25 PM.            Assessment /Plan     For exam results, see Encounter Report.    Acute iritis, left eye    Secondary glaucoma, left, severe stage    Afferent pupillary defect of left eye    Vitreous in anterior chamber of left eye    Intermediate stage dry age-related macular degeneration of both eyes    Lens subluxation, left    Posterior vitreous detachment, bilateral    Pseudophakia of both eyes        [facility] pain left eye // [facility] PT -- Acute iritis os / 9/24/2024   F/U IRITIS OS - 9/24/2024       S/P ahmed and ant vit os - 4/4/2024 - [facility]   VA improving  IOP 16/16   Cornea clear  timolol qAM OS    // secondary open angle glaucoma severe with APD OS upon presentation  -Fhx (-), Steroids (+),Trauma(-)  -Drops:  --> surgery gdd -->  timolol qAM OS   -Drop intolerance/contraindication: Rocklatan not effective per prior notes, Diamox made patient stomache   -Laser: SLT OS x 1 (2-3 years ago)   -Surgeries: CE IOL OU // PPV OS (steven 8/23) // Anterior vitrectomy with AVG OS  ([facility] 4/4/24)   -CCT: 613 // 619  -Gonio: SS OU  Tm 50s OS per pt    HVF's 2 test 2023 to 2023    New base Met - 1 test 2024 to 2024 - full od // dense SAD / IAD os   OCT's 3 test 2023 to 2024    New base 1 test 11/2024 to 2024 - Met - RNFL nl od // dec G/NS/TI     Ttoday 16  Test today IOP / HVF / OCT     H/O  -- Vitreous in AC OS  S/p PPV Dr. Harris 8/2/23   S/p anterior vitrectomy Dr. Beck 4/4/24     IOL subluxation OS  Return to Dr. Mazzulla if dislocates or needs sutured     Pseudophakia OD  Lenses WC OD, monitor    ARMD  Intermediate. AREDS  Not using - educate and provide information  "    Posterior Vitreous Detachment  Retinal detachment precautions were reviewed the patient.    If any increase in flashing lights or floaters, the patient should return immediately to rule out retinal detachment.      PLAN  IOP great post ahmed os   Cont Timolol qAM OS  F/U 4 months with HVF / DFE / OCT     If the double vision is constant or there most of the time can consult Dr JANET Brown   Pt use to have an XT as a child - corrected with fusion exercised ( Rx [facility] with "pencil push[facility]"     Ok to see Dr Plaza prhortencia and for refraction ( he did the CE and then had the vit prolapse and 2nd glaucoma)     Urgent care 9/24/2024   + iritis os   Start PA qId os   Atropine bid os   Cont timolol 1 x day os     10/27/2024   IRITIS IMPROVED - EYE PAIN FREE OS   ADJUST GTTS - OS  PA 2 x day for 2  weeks / then decrease to 1 x day for 4 weeks then stop   HOPEFULLY CAN GET PT OFF STEROIDS - BUT MAY END UP NEEDING MAINTENANCE STEROIDS   Cont timolol 1 x day os   Copy of VF and OCT given to pt to take to dr Tuttle     F/u 4 MONTHS - IOP  // DILATE - DISC PHOTOS - NONE ON FILE     "

## 2024-11-12 ENCOUNTER — CLINICAL SUPPORT (OUTPATIENT)
Dept: OPHTHALMOLOGY | Facility: CLINIC | Age: 74
End: 2024-11-12
Payer: MEDICARE

## 2024-11-12 ENCOUNTER — OFFICE VISIT (OUTPATIENT)
Dept: OPHTHALMOLOGY | Facility: CLINIC | Age: 74
End: 2024-11-12
Payer: MEDICARE

## 2024-11-12 DIAGNOSIS — H40.52X3 SECONDARY GLAUCOMA, LEFT, SEVERE STAGE: ICD-10-CM

## 2024-11-12 DIAGNOSIS — Z96.1 PSEUDOPHAKIA OF BOTH EYES: ICD-10-CM

## 2024-11-12 DIAGNOSIS — H43.02 VITREOUS IN ANTERIOR CHAMBER OF LEFT EYE: ICD-10-CM

## 2024-11-12 DIAGNOSIS — H43.813 POSTERIOR VITREOUS DETACHMENT, BILATERAL: ICD-10-CM

## 2024-11-12 DIAGNOSIS — H20.00 ACUTE IRITIS, LEFT EYE: Primary | ICD-10-CM

## 2024-11-12 DIAGNOSIS — H27.112 LENS SUBLUXATION, LEFT: ICD-10-CM

## 2024-11-12 DIAGNOSIS — H35.3132 INTERMEDIATE STAGE DRY AGE-RELATED MACULAR DEGENERATION OF BOTH EYES: ICD-10-CM

## 2024-11-12 DIAGNOSIS — H21.562 AFFERENT PUPILLARY DEFECT OF LEFT EYE: ICD-10-CM

## 2024-11-12 PROCEDURE — 99999 PR PBB SHADOW E&M-EST. PATIENT-LVL II: CPT | Mod: PBBFAC,,, | Performed by: OPHTHALMOLOGY

## 2024-11-12 PROCEDURE — 99212 OFFICE O/P EST SF 10 MIN: CPT | Mod: PBBFAC,PO | Performed by: OPHTHALMOLOGY

## 2024-11-12 NOTE — PROGRESS NOTES
OCT DONE OU      24-2 SS DONE OU   REL & FIX =  good od                         Poor  os       Coop =    Fair     Patient has no allergies to latex or adhesives at this time  Patient had a lot of fixation loss with os     Jthomas    No mrx  Used age correction lens for testing

## 2025-03-08 NOTE — PROGRESS NOTES
HPI    DLS: 11/12/2024    Pt here for 4 Month IOP Check;  Pt states he is still using the PA eye drops every time he would taper off   the eye pain in the OS would come back. Pt states he needs refills on his   PA eye drops and Timolol.      Meds;  Timolol QAM OS  PA TID OS    1. Severe Secondary OAG OS   2. APD OS   3. Iritis OS   4. IOL Subluxation OS   4. PCIOL OD   5. ARMD   6. PVD OU       Last edited by Ayanna Christiansen on 3/11/2025  1:59 PM.            Assessment /Plan     For exam results, see Encounter Report.    Secondary glaucoma, left, severe stage  -     timolol maleate 0.5% (TIMOPTIC) 0.5 % Drop; Place 1 drop into the left eye every morning.  Dispense: 15 mL; Refill: 12    Afferent pupillary defect of left eye  -     timolol maleate 0.5% (TIMOPTIC) 0.5 % Drop; Place 1 drop into the left eye every morning.  Dispense: 15 mL; Refill: 12    Vitreous in anterior chamber of left eye    Intermediate stage dry age-related macular degeneration of both eyes  -     timolol maleate 0.5% (TIMOPTIC) 0.5 % Drop; Place 1 drop into the left eye every morning.  Dispense: 15 mL; Refill: 12    Lens subluxation, left    Posterior vitreous detachment, bilateral    Pseudophakia of both eyes  -     timolol maleate 0.5% (TIMOPTIC) 0.5 % Drop; Place 1 drop into the left eye every morning.  Dispense: 15 mL; Refill: 12    H/O iritis    Acute iritis, left eye  -     prednisoLONE acetate (PRED FORTE) 1 % DrpS; Place 1 drop into the left eye 3 (three) times daily.  Dispense: 15 mL; Refill: 3        [facility] pain left eye // [facility] PT -- Acute iritis os / 9/24/2024   F/U IRITIS OS - 9/24/2024 - resolved      S/P ahmed and ant vit os - 4/4/2024 - [facility]   VA improving  IOP 16/16   Cornea clear  timolol qAM OS    // secondary open angle glaucoma severe with APD OS upon presentation  -Fhx (-), Steroids (+),Trauma(-)  -Drops:  --> surgery gdd -->  timolol qAM OS   -Drop intolerance/contraindication: Rocklatan not effective per prior notes,  "Diamox made patient stomache   -Laser: SLT OS x 1 (2-3 years ago)   -Surgeries: CE IOL OU // PPV OS (steven 8/23) // Anterior vitrectomy with AVG OS  ([facility] 4/4/24)   -CCT: 613 // 619  -Gonio: SS OU  Tm 50s OS per pt    HVF's 2 test 2023 to 2023    New base Met - 1 test 2024 to 2024 - full od // dense SAD / IAD os   OCT's 3 test 2023 to 2024    New base 1 test 11/2024 to 2024 - Met - RNFL nl od // dec G/NS/TI     Ttoday 15/16   Test today IOP /     H/O  -- Vitreous in AC OS  S/p PPV Dr. Harris 8/2/23   S/p anterior vitrectomy Dr. Beck 4/4/24     IOL subluxation OS  Return to Dr. Harris if dislocates or needs sutured     Pseudophakia OD  Lenses WC OD, monitor    ARMD  Intermediate. AREDS  Not using - educate and provide information     Posterior Vitreous Detachment  Retinal detachment precautions were reviewed the patient.    If any increase in flashing lights or floaters, the patient should return immediately to rule out retinal detachment.      PLAN  IOP great post ahmed os   Cont Timolol qAM OS  F/U 4 months with HVF / DFE / OCT     If the double vision is constant or there most of the time can consult Dr JANET Brown   Pt use to have an XT as a child - corrected with fusion exercised ( [facility] with "pencil push[facility]"     Ok to see Dr Bola basurto and for refraction ( he did the CE and then had the vit prolapse and 2nd glaucoma)     Urgent care 9/24/2024   + iritis os   Start PA qId os   Atropine bid os   Cont timolol 1 x day os     3/11/2025   IRITIS IMPROVED - EYE PAIN FREE OS   But was unable to taper pre acetate - had to increase back up to tid os  IOP ok on timolol q am        F/u 4 MONTHS - IOP  // DILATE - DISC PHOTOS - NONE ON FILE - PT WAS DILATED TODAY (3/11/2025) FOR PHOTOS BUT  WAS BACKED UP - TRY AT NEXT VISIT   IF doing ok at next visit can try deceasing steroid gtts to bid - appears to need maintenance PA os     "

## 2025-03-11 ENCOUNTER — OFFICE VISIT (OUTPATIENT)
Dept: OPHTHALMOLOGY | Facility: CLINIC | Age: 75
End: 2025-03-11
Payer: MEDICARE

## 2025-03-11 DIAGNOSIS — H43.813 POSTERIOR VITREOUS DETACHMENT, BILATERAL: ICD-10-CM

## 2025-03-11 DIAGNOSIS — H35.3132 INTERMEDIATE STAGE DRY AGE-RELATED MACULAR DEGENERATION OF BOTH EYES: ICD-10-CM

## 2025-03-11 DIAGNOSIS — H40.52X3 SECONDARY GLAUCOMA, LEFT, SEVERE STAGE: Primary | ICD-10-CM

## 2025-03-11 DIAGNOSIS — H20.00 ACUTE IRITIS, LEFT EYE: ICD-10-CM

## 2025-03-11 DIAGNOSIS — H21.562 AFFERENT PUPILLARY DEFECT OF LEFT EYE: ICD-10-CM

## 2025-03-11 DIAGNOSIS — H43.02 VITREOUS IN ANTERIOR CHAMBER OF LEFT EYE: ICD-10-CM

## 2025-03-11 DIAGNOSIS — H27.112 LENS SUBLUXATION, LEFT: ICD-10-CM

## 2025-03-11 DIAGNOSIS — Z86.69 H/O IRITIS: ICD-10-CM

## 2025-03-11 DIAGNOSIS — Z96.1 PSEUDOPHAKIA OF BOTH EYES: ICD-10-CM

## 2025-03-11 PROCEDURE — 99999 PR PBB SHADOW E&M-EST. PATIENT-LVL II: CPT | Mod: PBBFAC,,, | Performed by: OPHTHALMOLOGY

## 2025-03-11 PROCEDURE — 99212 OFFICE O/P EST SF 10 MIN: CPT | Mod: PBBFAC,PO | Performed by: OPHTHALMOLOGY

## 2025-03-11 RX ORDER — TIMOLOL MALEATE 5 MG/ML
1 SOLUTION/ DROPS OPHTHALMIC EVERY MORNING
Qty: 15 ML | Refills: 12 | Status: SHIPPED | OUTPATIENT
Start: 2025-03-11 | End: 2026-03-11

## 2025-03-11 RX ORDER — PREDNISOLONE ACETATE 10 MG/ML
1 SUSPENSION/ DROPS OPHTHALMIC 3 TIMES DAILY
Qty: 15 ML | Refills: 3 | Status: SHIPPED | OUTPATIENT
Start: 2025-03-11

## 2025-07-20 NOTE — PROGRESS NOTES
HPI    DLS: 3/11/2025    Pt here for 4 Month Check/Disc Photos;  Pt states no eye pain or discomfort.     Meds;  Timolol QAM OS  PA TID OS (has rebound iritis and pain - when tapers lower)     1. Severe Secondary OAG OS   2. APD OS   3. Iritis OS   4. IOL Subluxation OS   4. PCIOL OD   5. ARMD   6. PVD OU     Last edited by Jayla Han MD on 7/22/2025  2:42 PM.            Assessment /Plan     For exam results, see Encounter Report.    Secondary glaucoma, left, severe stage  -     dorzolamide-timolol 2-0.5% (COSOPT) 22.3-6.8 mg/mL ophthalmic solution; Place 1 drop into the left eye 2 (two) times daily.  Dispense: 10 mL; Refill: 6  -     Color Fundus Photography - OU - Both Eyes; Future    Afferent pupillary defect of left eye    Vitreous in anterior chamber of left eye    Intermediate stage dry age-related macular degeneration of both eyes    Lens subluxation, left    Posterior vitreous detachment, bilateral    Pseudophakia of both eyes    H/O iritis    Acute iritis, left eye  -     prednisoLONE acetate (PRED FORTE) 1 % DrpS; Place 1 drop into the left eye 2 (two) times daily.  Dispense: 10 mL; Refill: 6        UCARE pain left eye // [facility] PT -- Acute iritis os / 9/24/2024   F/U IRITIS OS - 9/24/2024 - resolved      S/P ahmed and ant vit os - 4/4/2024 - [facility]   VA improving  IOP 19/19   Cornea clear  timolol qAM OS- IOP higher than ideal os - cahnge timolol to cosopt bid os     // secondary open angle glaucoma severe with APD OS upon presentation  -Fhx (-), Steroids (+),Trauma(-)  -Drops:  --> surgery gdd -->  timolol qAM OS -->cosopt   -Drop intolerance/contraindication: Rocklatan not effective per prior notes, Diamox made patient stomache   -Laser: SLT OS x 1 (2-3 years ago)   -Surgeries: CE IOL OU // PPV OS (rickzulla 8/23) // Anterior vitrectomy with AVG OS  ([facility] 4/4/24)   -CCT: 613 // 619  -Gonio: SS OU  Tm 50s OS per pt    HVF's 2 test 2023 to 2023    New base Met - 1 test 2024 to 2024 - full od //  "dense SAD / IAD os   OCT's 3 test 2023 to 2024    New base 1 test 11/2024 to 2024 - Met - RNFL nl od // dec G/NS/TI     Ttoday 19/19  CDR 0.1-0.2 // 0.9   Test today IOP /  DFE / disc photos     H/O  -- Vitreous in AC OS  S/p PPV Dr. Harris 8/2/23   S/p anterior vitrectomy Dr. Beck 4/4/24     IOL subluxation OS  Return to Dr. Harris if dislocates or needs sutured     Pseudophakia OD  Lenses WC OD, monitor    ARMD  Intermediate. AREDS  Not using - educate and provide information     Posterior Vitreous Detachment  Retinal detachment precautions were reviewed the patient.    If any increase in flashing lights or floaters, the patient should return immediately to rule out retinal detachment.    If the double vision is constant or there most of the time can consult Dr JANET Brown   Pt use to have an XT as a child - corrected with fusion exercised ( [facility] with "pencil pushups"     Ok to see Dr Bola basurto and for refraction ( he did the CE and then had the vit prolapse and 2nd glaucoma)     Urgent care 9/24/2024   + iritis os   Start PA qId os   Atropine bid os   Cont timolol 1 x day os     7/22/2025   S/P ahmed os   IOP creeping up - still on PA tid for recurrent / chronic iritis os   Change timolol os to cosopt bid  os (has used in past)   Try decreasing PA form tid os to bid os ( likely needs long term maintenance PA gtts )     F/U 4 months - IOP check with change in gtts - MET      "

## 2025-07-22 ENCOUNTER — OFFICE VISIT (OUTPATIENT)
Dept: OPHTHALMOLOGY | Facility: CLINIC | Age: 75
End: 2025-07-22
Payer: MEDICARE

## 2025-07-22 DIAGNOSIS — H27.112 LENS SUBLUXATION, LEFT: ICD-10-CM

## 2025-07-22 DIAGNOSIS — H20.00 ACUTE IRITIS, LEFT EYE: ICD-10-CM

## 2025-07-22 DIAGNOSIS — H43.813 POSTERIOR VITREOUS DETACHMENT, BILATERAL: ICD-10-CM

## 2025-07-22 DIAGNOSIS — H40.52X3 SECONDARY GLAUCOMA, LEFT, SEVERE STAGE: Primary | ICD-10-CM

## 2025-07-22 DIAGNOSIS — H35.3132 INTERMEDIATE STAGE DRY AGE-RELATED MACULAR DEGENERATION OF BOTH EYES: ICD-10-CM

## 2025-07-22 DIAGNOSIS — Z86.69 H/O IRITIS: ICD-10-CM

## 2025-07-22 DIAGNOSIS — H43.02 VITREOUS IN ANTERIOR CHAMBER OF LEFT EYE: ICD-10-CM

## 2025-07-22 DIAGNOSIS — Z96.1 PSEUDOPHAKIA OF BOTH EYES: ICD-10-CM

## 2025-07-22 DIAGNOSIS — H21.562 AFFERENT PUPILLARY DEFECT OF LEFT EYE: ICD-10-CM

## 2025-07-22 PROCEDURE — 99212 OFFICE O/P EST SF 10 MIN: CPT | Mod: PBBFAC,PO | Performed by: OPHTHALMOLOGY

## 2025-07-22 PROCEDURE — 99214 OFFICE O/P EST MOD 30 MIN: CPT | Mod: S$PBB,,, | Performed by: OPHTHALMOLOGY

## 2025-07-22 PROCEDURE — 99999 PR PBB SHADOW E&M-EST. PATIENT-LVL II: CPT | Mod: PBBFAC,,, | Performed by: OPHTHALMOLOGY

## 2025-07-22 PROCEDURE — 92250 FUNDUS PHOTOGRAPHY W/I&R: CPT | Mod: PBBFAC,PO | Performed by: OPHTHALMOLOGY

## 2025-07-22 RX ORDER — DORZOLAMIDE HYDROCHLORIDE AND TIMOLOL MALEATE 20; 5 MG/ML; MG/ML
1 SOLUTION/ DROPS OPHTHALMIC 2 TIMES DAILY
Qty: 10 ML | Refills: 6 | Status: SHIPPED | OUTPATIENT
Start: 2025-07-22

## 2025-07-22 RX ORDER — PREDNISOLONE ACETATE 10 MG/ML
1 SUSPENSION/ DROPS OPHTHALMIC 2 TIMES DAILY
Qty: 10 ML | Refills: 6 | Status: SHIPPED | OUTPATIENT
Start: 2025-07-22

## (undated) DEVICE — SOL BSS BALANCED SALT

## (undated) DEVICE — KIT PERFLUOROCARBON LIQUID

## (undated) DEVICE — LENS VITRCTMY OPHTH 30DEG 59DE

## (undated) DEVICE — SHIELD EYE METAL FOX 50/BX

## (undated) DEVICE — GLOVE BIOGEL ECLIPSE SZ 6.5

## (undated) DEVICE — KIT GREY EYE

## (undated) DEVICE — PROBE ILLUM FLEX CURVE LASER

## (undated) DEVICE — SOL WATER STRL IRR 1000ML

## (undated) DEVICE — SYR 10CC LUER LOCK

## (undated) DEVICE — SYR DISP LL 5CC

## (undated) DEVICE — PACK CATARACT OPTHALMIC CUSTOM

## (undated) DEVICE — SYR 1CC TB SG 27GX1/2

## (undated) DEVICE — NDL 22GA X1 1/2 REG BEVEL

## (undated) DEVICE — NEEDLE HYPODERMIC HUB LUER LOC

## (undated) DEVICE — KNIFE ANGLE 1MM

## (undated) DEVICE — CONTAINER SPECIMEN STRL 4OZ

## (undated) DEVICE — DRAPE OPHTHALMIC 48X62 FEN

## (undated) DEVICE — SOL BETADINE 5%

## (undated) DEVICE — CORD FOR BIPOLAR FORCEPS 12

## (undated) DEVICE — SYRINGE 30CC LL W/O NDL

## (undated) DEVICE — SOL GONAK

## (undated) DEVICE — PACK INSTRUMENT COVER DISPO

## (undated) DEVICE — STRIP MEDI WND CLSR 1/2X4IN

## (undated) DEVICE — SOL BALANCED SALT 500ML

## (undated) DEVICE — SPONGE WEC CEL SPEARS

## (undated) DEVICE — DRESSING TRANS 4X4 TEGADERM

## (undated) DEVICE — DRAPE STERI INCISE MED 130X130

## (undated) DEVICE — KNIFE OPHTH MICRO UNITOME 5MM

## (undated) DEVICE — NDL HYPO A BEVEL 30X1/2

## (undated) DEVICE — DRAPE THREE-QTR REINF 53X77IN

## (undated) DEVICE — PACK TOTAL PLUS 25G VITRECTOMY

## (undated) DEVICE — SHIELD COLLAGEN 12HR CORNEAL

## (undated) DEVICE — FORCEP GRASPING 25GA SMOOTH

## (undated) DEVICE — DRESSING EYE OVAL LF

## (undated) DEVICE — CANNULA ANTERIOR CHAMBER 30G

## (undated) DEVICE — SUT CTD VICRYL 7-0 TG1408T

## (undated) DEVICE — TRAY MUSCLE LID EYE

## (undated) DEVICE — FORCEP GRIESHABER MAXGRIP 25G

## (undated) DEVICE — SUT 7/0 18IN COATED VICRYL

## (undated) DEVICE — HOLDER TUBE

## (undated) DEVICE — COVER PROXIMA MAYO STAND

## (undated) DEVICE — NDL BOX COUNTER

## (undated) DEVICE — COVER MAYO STAND REINFRCD 30

## (undated) DEVICE — GOWN SURGICAL X-LARGE

## (undated) DEVICE — KIT MEROCEL INSTRUMENT WIPE

## (undated) DEVICE — BACKFLUSH 25GA SOFT-TIP DISP